# Patient Record
Sex: MALE | Race: WHITE | NOT HISPANIC OR LATINO | Employment: OTHER | URBAN - METROPOLITAN AREA
[De-identification: names, ages, dates, MRNs, and addresses within clinical notes are randomized per-mention and may not be internally consistent; named-entity substitution may affect disease eponyms.]

---

## 2024-10-31 ENCOUNTER — OFFICE VISIT (OUTPATIENT)
Dept: URGENT CARE | Age: 84
End: 2024-10-31
Payer: MEDICARE

## 2024-10-31 VITALS
HEART RATE: 67 BPM | RESPIRATION RATE: 18 BRPM | TEMPERATURE: 98 F | SYSTOLIC BLOOD PRESSURE: 148 MMHG | BODY MASS INDEX: 20.76 KG/M2 | DIASTOLIC BLOOD PRESSURE: 75 MMHG | OXYGEN SATURATION: 98 % | HEIGHT: 70 IN | WEIGHT: 145 LBS

## 2024-10-31 DIAGNOSIS — R09.82 POST-NASAL DRIP: Primary | ICD-10-CM

## 2024-10-31 RX ORDER — AMLODIPINE BESYLATE 5 MG/1
5 TABLET ORAL DAILY
COMMUNITY
Start: 2024-05-13

## 2024-10-31 RX ORDER — KETOCONAZOLE 20 MG/G
CREAM TOPICAL DAILY
COMMUNITY

## 2024-10-31 RX ORDER — VIT C/E/ZN/COPPR/LUTEIN/ZEAXAN 250MG-90MG
25 CAPSULE ORAL DAILY
COMMUNITY

## 2024-10-31 RX ORDER — AMMONIUM LACTATE 12 G/100G
LOTION TOPICAL 2 TIMES DAILY
COMMUNITY

## 2024-10-31 RX ORDER — TRIAMCINOLONE ACETONIDE 5 MG/G
CREAM TOPICAL 2 TIMES DAILY
COMMUNITY

## 2024-10-31 RX ORDER — NAPROXEN SODIUM 220 MG/1
81 TABLET, FILM COATED ORAL ONCE
COMMUNITY

## 2024-10-31 RX ORDER — ATORVASTATIN CALCIUM 40 MG/1
40 TABLET, FILM COATED ORAL DAILY
COMMUNITY
Start: 2024-05-13

## 2024-10-31 RX ORDER — SILDENAFIL 100 MG/1
100 TABLET, FILM COATED ORAL AS NEEDED
COMMUNITY

## 2024-10-31 RX ORDER — METOPROLOL SUCCINATE 50 MG/1
50 TABLET, EXTENDED RELEASE ORAL DAILY
COMMUNITY

## 2024-10-31 RX ORDER — SIMVASTATIN 20 MG/1
20 TABLET, FILM COATED ORAL NIGHTLY
COMMUNITY

## 2024-10-31 RX ORDER — FLUTICASONE PROPIONATE 50 MCG
1 SPRAY, SUSPENSION (ML) NASAL 2 TIMES DAILY
Qty: 16 G | Refills: 0 | Status: SHIPPED | OUTPATIENT
Start: 2024-10-31 | End: 2024-11-07

## 2024-10-31 RX ORDER — CLONAZEPAM 0.5 MG/1
0.5 TABLET ORAL DAILY
COMMUNITY

## 2024-10-31 RX ORDER — METOPROLOL SUCCINATE 25 MG/1
25 TABLET, EXTENDED RELEASE ORAL DAILY
COMMUNITY
Start: 2024-05-13

## 2025-02-03 NOTE — PROGRESS NOTES
Assessment and Plan:  To Parnell is a 84 y.o. 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 After discussing the risks and benefits of wide local excision with sentinel lymph node biopsy the patient understands and would like to proceed. We will look for OR time at [facility] in the coming weeks. I believe this will close primarily with a *** cm margin.     ***Tumor board discussion is pending    I spent 60 minutes in the professional and overall care of this patient.    Ruddy Kat MD  Assistant Professor of Surgery  Division of Surgical Oncology  [phone]  [email or web address]      History Of Present Illness  Referring provider: Jann Cárdenas  Diagnosis: melanoma  Location: left neck  Thickness: 2.1 mm  Margins: transected deep margin  Subtype: desmoplastic  High-risk features: dedifferentiation, ulceration, 1 mitosis    All other systems have been reviewed and are negative except as noted in the HPI.    I personally reviewed all necessary laboratory results, pathology reports, and radiologic images for this patient.    Past Medical History  He has no past medical history on file.    Surgical History  He has no past surgical history on file.     Social History  He reports that he has quit smoking. His smoking use included cigarettes. He has never used smokeless tobacco. No history on file for alcohol use and drug use.    Family History  No family history on file.     Allergies  Amoxicillin     Last Recorded Vitals  There were no vitals taken for this visit.    Physical Exam  General: no acute distress, well-nourished  Eyes: intact EOM, no scleral icterus  ENT: hearing intact, no drainage  Respiratory: symmetric chest rise, no cough  Cardiovascular: intact distal pulses, no pitting edema  Abdominal: Soft, nontender  Musculoskeletal: no deformities, intact strength  Integumentary: Healing biopsy site, no lymphadenopathy  Neuro:  no focal deficits, sensation intact  Psych: normal mood and affect     Relevant Results  ***internal review is pending

## 2025-02-05 ENCOUNTER — APPOINTMENT (OUTPATIENT)
Dept: SURGICAL ONCOLOGY | Facility: CLINIC | Age: 85
End: 2025-02-05
Payer: MEDICARE

## 2025-02-07 ENCOUNTER — LAB REQUISITION (OUTPATIENT)
Dept: DERMATOPATHOLOGY | Facility: CLINIC | Age: 85
End: 2025-02-07
Payer: MEDICARE

## 2025-02-07 ENCOUNTER — OFFICE VISIT (OUTPATIENT)
Facility: CLINIC | Age: 85
End: 2025-02-07
Payer: MEDICARE

## 2025-02-07 VITALS — WEIGHT: 145 LBS | BODY MASS INDEX: 20.76 KG/M2 | HEIGHT: 70 IN

## 2025-02-07 DIAGNOSIS — C43.4 MELANOMA OF NECK (MULTI): ICD-10-CM

## 2025-02-07 DIAGNOSIS — C43.4 MALIGNANT MELANOMA OF SCALP AND NECK (MULTI): ICD-10-CM

## 2025-02-07 PROCEDURE — 1160F RVW MEDS BY RX/DR IN RCRD: CPT | Performed by: OTOLARYNGOLOGY

## 2025-02-07 PROCEDURE — 1036F TOBACCO NON-USER: CPT | Performed by: OTOLARYNGOLOGY

## 2025-02-07 PROCEDURE — 99204 OFFICE O/P NEW MOD 45 MIN: CPT | Performed by: OTOLARYNGOLOGY

## 2025-02-07 PROCEDURE — 1126F AMNT PAIN NOTED NONE PRSNT: CPT | Performed by: OTOLARYNGOLOGY

## 2025-02-07 PROCEDURE — 1159F MED LIST DOCD IN RCRD: CPT | Performed by: OTOLARYNGOLOGY

## 2025-02-07 ASSESSMENT — PAIN SCALES - GENERAL: PAINLEVEL_OUTOF10: 0-NO PAIN

## 2025-02-07 NOTE — PROGRESS NOTES
ENT Outpatient Consultation    Chief Complaint: Melanoma  History Of Present Illness  To Parnell is a 84 y.o. male presents for evaluation of left neck melanoma. 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 Notes having some skin lesions frozen off in the past but denies personal history of skin cancer nor familial history of melanoma. No prior ENT surgeries. No symptomatic complaints otherwise since biopsy. Denies issues with neck pain/swelling or weight loss. Has history of heart surgery 10 years ago, on baby aspirin now. Retired . Here with wife.     Past Medical History  He has no past medical history on file.    Surgical History  He has no past surgical history on file.     Social History  He reports that he has quit smoking. His smoking use included cigarettes. He has never used smokeless tobacco. No history on file for alcohol use and drug use.    Family History  No family history on file.     Allergies  Amoxicillin     Physical Exam:  CONSTITUTIONAL:  No acute distress  VOICE:  No hoarseness or other abnormality  RESPIRATION:  Breathing comfortably, no stridor  CV:  No clubbing/cyanosis/edema in hands  EYES:  EOM intact, sclera normal  NEURO:  Alert and oriented times 3, Cranial nerves II-XII grossly intact and symmetric bilaterally  HEAD AND FACE:  Symmetric facial features, no masses or lesions, sinuses non-tender to palpation  SALIVARY GLANDS:  Parotid and submandibular glands normal bilaterally  EARS:  Normal external ears, external auditory canals, and TMs to otoscopy, normal hearing to whispered voice.  NOSE:  External nose midline, anterior rhinoscopy is normal with limited visualization to the anterior aspect of the interior turbinates, no bleeding or drainage, no lesions  ORAL CAVITY/OROPHARYNX/LIPS:  Normal mucous membranes, normal  floor of mouth/tongue/OP, no masses or lesions  PHARYNGEAL WALLS:  No masses or lesions  NECK/LYMPH:  No LAD, no thyroid masses, trachea midline  SKIN:  Left inferior anterior neck with sub-centimeter crusted-over scar  PSYCH:  Alert and oriented with appropriate mood and affect     Last Recorded Vitals  There were no vitals taken for this visit.    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 male with new desmoplastic melanoma of left inferolateral neck on recent punch biopsy without other concerning findings on presentation.    Discussed with patient need for sentinel lymph node biopsy to aid in guiding definitive management of said melanoma with wide local excision of lesion as well with primary closure. Informed consent including risks, benefits and alternatives of procedure as well as procedure itself discussed with patient who agreed to proceed with surgical management.    Andrew Drummond MD

## 2025-02-10 ENCOUNTER — TUMOR BOARD CONFERENCE (OUTPATIENT)
Dept: HEMATOLOGY/ONCOLOGY | Facility: HOSPITAL | Age: 85
End: 2025-02-10
Payer: MEDICARE

## 2025-02-10 DIAGNOSIS — C43.4 MELANOMA OF NECK (MULTI): Primary | ICD-10-CM

## 2025-02-10 LAB
PATH REPORT.FINAL DX SPEC: NORMAL
PATH REPORT.GROSS SPEC: NORMAL
PATH REPORT.RELEVANT HX SPEC: NORMAL
PATH REPORT.TOTAL CANCER: NORMAL
PATHOLOGY SYNOPTIC REPORT: NORMAL

## 2025-02-10 NOTE — TUMOR BOARD NOTE
General Patient Information  Name:  To Parnell  Evaluation #:  1  Conference Date:  2/10/2025  YOB: 1940  MRN:  [id number]  Program Physician(s):  Fermin Bautista  Referring Physician(s):  Jann Valenzuela      Summary   Stage: at least cIIB ([id number])   Melanoma 5 year survival: 87%    Assessment:  Left inferior lateral neck with an ulcerated melanoma, with features of desmoplastic and a dedifferentiated melanoma, Breslow at least 2.2 mm, broadly transected on the deep margin.    Recommendation: Consider imaging studies. WLE with 2 cm margins and SLNB.  Consider SWOG  study.    Review Multidisciplinary Cutaneous Oncology Conference recommendation with patient.  Continue routine follow up and total body skin exams with Jann Cárdenas.    Follow Up:  Jann Valenzuela      History and Physical Exam  Dermatologic History:   84 y.o. male with a biopsy of the left inferior lateral neck on 1/13/2025 showing an ulcerated melanoma,  with features of desmoplastic and a dedifferentiated melanoma  , Breslow: at least 2.2 mm, broadly transected on the deep margin.     Past Medical History:  No past medical history on file.    Family History of DNS/MM:  Unknown    Skin:   Left inferior anterior neck with sub-centimeter crusted-over scar     Lymphatic:  No LAD, no thyroid masses, trachea midline       Pathology  Dermpath Consult: [id number]     13 SLIDES, [facility], #[id number] (BX: 01/13/2025)     SKIN, LEFT INFERIOR LATERAL NECK, SHAVE BIOPSY:  ULCERATED MALIGNANT MELANOMA, BRESLOW THICKNESS AT LEAST 2.2 MM, PRESENT ON THE DEEP AND PERIPHERAL MARGIN, SEE NOTE.     Note: Microscopic examination reveals a specimen that extends into the mid to deep reticular dermis. There is an ulcer and there are atypical spindle cells with areas of dense solar elastosis. A moderate number of the spindle cells stain with antibodies against SOX-10 and S100  predominantly at the periphery. Centrally they do not stain with antibodies against SOX-10 or S100 and stain with antibodies against CD10. They do not stain with antibodies against MART/Melan-A, HMB45, CD31, CD34, CKAE1/AE3, Desmin or p63. They stain with antibodies against p53 and centrally they stain moderately with antibodies against Actin.      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      ** Electronically signed out by Gaurav Cantrell MD **    SPECIMEN   Procedure  Biopsy, shave   Specimen Laterality  Left   TUMOR   Tumor Site  Skin of scalp and neck: Left inferior lateral neck          Histologic Type  Features of desmoplastic melanoma and a dedifferentiated melanoma.     Maximum Tumor (Breslow) Thickness (Millimeters)  At least: 2.2 mm     Broadly transected on the deep margin   Ulceration  Not identified   Anatomic (Luis) Level  At least level: IV     Broadly transected on the deep margin   Mitotic Rate  1 mitoses per mm2   Microsatellite(s)  Not identified   Lymphovascular Invasion  Not identified   Neurotropism  Not identified   Tumor-Infiltrating Lymphocytes  Present, nonbrisk   Tumor Regression  Not identified   MARGINS     Margin Status for Invasive Melanoma  Invasive melanoma present at margin   Margin(s) Involved by Invasive Melanoma  Deep   Margin Status for Melanoma in situ  All margins negative for melanoma in situ   PATHOLOGIC STAGE CLASSIFICATION (pTNM, AJCC 8th Edition)     pT Category  pT3b     Radiology      Clinical Images  2/7/2025

## 2025-02-17 ENCOUNTER — TUMOR BOARD CONFERENCE (OUTPATIENT)
Dept: HEMATOLOGY/ONCOLOGY | Facility: HOSPITAL | Age: 85
End: 2025-02-17
Payer: MEDICARE

## 2025-02-18 ENCOUNTER — APPOINTMENT (OUTPATIENT)
Dept: SURGICAL ONCOLOGY | Facility: CLINIC | Age: 85
End: 2025-02-18
Payer: MEDICARE

## 2025-02-19 ENCOUNTER — LAB (OUTPATIENT)
Dept: LAB | Facility: HOSPITAL | Age: 85
End: 2025-02-19
Payer: MEDICARE

## 2025-02-19 ENCOUNTER — PRE-ADMISSION TESTING (OUTPATIENT)
Dept: PREADMISSION TESTING | Facility: HOSPITAL | Age: 85
End: 2025-02-19
Payer: MEDICARE

## 2025-02-19 VITALS
SYSTOLIC BLOOD PRESSURE: 125 MMHG | HEIGHT: 70 IN | HEART RATE: 71 BPM | DIASTOLIC BLOOD PRESSURE: 66 MMHG | RESPIRATION RATE: 16 BRPM | BODY MASS INDEX: 20.42 KG/M2 | TEMPERATURE: 97.5 F | OXYGEN SATURATION: 100 % | WEIGHT: 142.64 LBS

## 2025-02-19 DIAGNOSIS — Z01.818 ENCOUNTER FOR OTHER PREPROCEDURAL EXAMINATION: Primary | ICD-10-CM

## 2025-02-19 DIAGNOSIS — Z01.818 PRE-OP TESTING: Primary | ICD-10-CM

## 2025-02-19 DIAGNOSIS — C43.4 MELANOMA OF NECK (MULTI): ICD-10-CM

## 2025-02-19 LAB
ALBUMIN SERPL BCP-MCNC: 4.5 G/DL (ref 3.4–5)
ALP SERPL-CCNC: 52 U/L (ref 33–136)
ALT SERPL W P-5'-P-CCNC: 16 U/L (ref 10–52)
ANION GAP SERPL CALC-SCNC: 11 MMOL/L (ref 10–20)
AST SERPL W P-5'-P-CCNC: 22 U/L (ref 9–39)
ATRIAL RATE: 68 BPM
BILIRUB SERPL-MCNC: 0.7 MG/DL (ref 0–1.2)
BUN SERPL-MCNC: 17 MG/DL (ref 6–23)
CALCIUM SERPL-MCNC: 9.6 MG/DL (ref 8.6–10.3)
CHLORIDE SERPL-SCNC: 99 MMOL/L (ref 98–107)
CO2 SERPL-SCNC: 30 MMOL/L (ref 21–32)
CREAT SERPL-MCNC: 0.74 MG/DL (ref 0.5–1.3)
EGFRCR SERPLBLD CKD-EPI 2021: 89 ML/MIN/1.73M*2
GLUCOSE SERPL-MCNC: 95 MG/DL (ref 74–99)
P AXIS: 34 DEGREES
P OFFSET: 186 MS
P ONSET: 136 MS
POTASSIUM SERPL-SCNC: 4.2 MMOL/L (ref 3.5–5.3)
PR INTERVAL: 168 MS
PROT SERPL-MCNC: 7.7 G/DL (ref 6.4–8.2)
Q ONSET: 220 MS
QRS COUNT: 11 BEATS
QRS DURATION: 86 MS
QT INTERVAL: 384 MS
QTC CALCULATION(BAZETT): 408 MS
QTC FREDERICIA: 400 MS
R AXIS: -11 DEGREES
SODIUM SERPL-SCNC: 136 MMOL/L (ref 136–145)
T AXIS: 65 DEGREES
T OFFSET: 412 MS
VENTRICULAR RATE: 68 BPM

## 2025-02-19 PROCEDURE — 93005 ELECTROCARDIOGRAM TRACING: CPT

## 2025-02-19 PROCEDURE — 99202 OFFICE O/P NEW SF 15 MIN: CPT | Performed by: NURSE PRACTITIONER

## 2025-02-19 ASSESSMENT — DUKE ACTIVITY SCORE INDEX (DASI)
CAN YOU PARTICIPATE IN MODERATE RECREATIONAL ACTIVITIES LIKE GOLF, BOWLING, DANCING, DOUBLES TENNIS OR THROWING A BASEBALL OR FOOTBALL: NO
CAN YOU DO MODERATE WORK AROUND THE HOUSE LIKE VACUUMING, SWEEPING FLOORS OR CARRYING GROCERIES: YES
CAN YOU DO YARD WORK LIKE RAKING LEAVES, WEEDING OR PUSHING A MOWER: YES
CAN YOU DO HEAVY WORK AROUND THE HOUSE LIKE SCRUBBING FLOORS OR LIFTING AND MOVING HEAVY FURNITURE: YES
CAN YOU CLIMB A FLIGHT OF STAIRS OR WALK UP A HILL: YES
CAN YOU PARTICIPATE IN STRENOUS SPORTS LIKE SWIMMING, SINGLES TENNIS, FOOTBALL, BASKETBALL, OR SKIING: NO
CAN YOU WALK A BLOCK OR TWO ON LEVEL GROUND: YES
CAN YOU DO LIGHT WORK AROUND THE HOUSE LIKE DUSTING OR WASHING DISHES: YES
CAN YOU HAVE SEXUAL RELATIONS: YES
TOTAL_SCORE: 36.7
DASI METS SCORE: 7.3
CAN YOU TAKE CARE OF YOURSELF (EAT, DRESS, BATHE, OR USE TOILET): YES
CAN YOU WALK INDOORS, SUCH AS AROUND YOUR HOUSE: YES
CAN YOU RUN A SHORT DISTANCE: NO

## 2025-02-19 ASSESSMENT — ENCOUNTER SYMPTOMS
EYES NEGATIVE: 1
NECK NEGATIVE: 1
RESPIRATORY NEGATIVE: 1
MUSCULOSKELETAL NEGATIVE: 1
CONSTITUTIONAL NEGATIVE: 1
GASTROINTESTINAL NEGATIVE: 1
ROS SKIN COMMENTS: SEE HPI
NEUROLOGICAL NEGATIVE: 1
ENDOCRINE NEGATIVE: 1

## 2025-02-19 ASSESSMENT — PAIN - FUNCTIONAL ASSESSMENT: PAIN_FUNCTIONAL_ASSESSMENT: 0-10

## 2025-02-19 ASSESSMENT — ACTIVITIES OF DAILY LIVING (ADL): ADL_SCORE: 0

## 2025-02-19 ASSESSMENT — LIFESTYLE VARIABLES: SMOKING_STATUS: NONSMOKER

## 2025-02-19 ASSESSMENT — PAIN SCALES - GENERAL: PAINLEVEL_OUTOF10: 0 - NO PAIN

## 2025-02-19 NOTE — H&P (VIEW-ONLY)
CPM/PAT Evaluation       Name: To Parnell (To Parnell)  /Age: 1940/84 y.o.     In-Person       Chief Complaint: melanoma    HPI  This is a very pleasant 83 yo with Pmhx of CAD, HLD, HTN, and melanoma left side of neck.  Pt reports that he noticed bump that looked like a pimple on the side of his neck.   He said it wouldn't go away and he had it checked out and biopsied.  It was + for melanoma.  He denies recent fever or chills.      Past Medical History:   Diagnosis Date    Anxiety     Coronary artery disease     ED (erectile dysfunction)     HISTORY    Hearing aid worn     HL (hearing loss)     [place] (hard of hearing)     Hyperlipidemia     Hypertension     Melanoma (Multi)        Past Surgical History:   Procedure Laterality Date    CORONARY ARTERY BYPASS GRAFT      HISTORY       Patient  reports being sexually active and has had partner(s) who are female.    Family History   Problem Relation Name Age of Onset    Dementia Sister         Allergies   Allergen Reactions    Amoxicillin Rash     Dentist gave recently     Dentist gave recently    Penicillins GI Upset    Ramipril Cough       Prior to Admission medications    Medication Sig Start Date End Date Taking? Authorizing Provider   amLODIPine (Norvasc) 5 mg tablet Take 1 tablet (5 mg) by mouth once daily. 24   Historical Provider, MD   ammonium lactate (Lac-Hydrin) 12 % lotion Apply topically twice a day.    Historical Provider, MD   aspirin 81 mg chewable tablet Chew 1 tablet (81 mg) 1 time.    Historical Provider, MD   atorvastatin (Lipitor) 40 mg tablet Take 1 tablet (40 mg) by mouth once daily. 24   Historical Provider, MD   cholecalciferol (Vitamin D-3) 25 MCG (1000 UT) capsule Take 1 capsule (25 mcg) by mouth once daily.    Historical Provider, MD   clonazePAM (KlonoPIN) 0.5 mg tablet Take 1 tablet (0.5 mg) by mouth once daily.    Historical Provider, MD   fluticasone (Flonase Allergy Relief) 50 mcg/actuation nasal spray  Administer 1 spray into each nostril 2 times a day for 7 days. Shake gently. Before first use, prime pump. After use, clean tip and replace cap. 10/31/24 11/7/24  Lina Cho PA-C   ketoconazole (NIZOral) 2 % cream Apply topically once daily.    Historical Provider, MD   metoprolol succinate XL (Toprol-XL) 25 mg 24 hr tablet Take 1 tablet (25 mg) by mouth once daily. 5/13/24   Historical Provider, MD   metoprolol succinate XL (Toprol-XL) 50 mg 24 hr tablet Take 1 tablet (50 mg) by mouth once daily.    Historical Provider, MD   sildenafil (Viagra) 100 mg tablet Take 1 tablet (100 mg) by mouth if needed.    Historical Provider, MD   simvastatin (Zocor) 20 mg tablet Take 1 tablet (20 mg) by mouth once daily at bedtime.    Historical Provider, MD   triamcinolone (Kenalog) 0.5 % cream Apply topically 2 times a day.    Historical Provider, MD        PAT ROS:   Constitutional:   neg    Neuro/Psych:    Denies  hx of seizure or stroke  neg    Eyes:   neg     use of corrective lenses  Ears:    hearing loss   hearing aides  Nose:   neg    Mouth:   neg    Throat:   neg    Neck:    No carotid bruits ausculutated  neg    Cardio:    Hx of CABG- follows with Dr. Cruz    Denies recent CP or palpitations  Respiratory:    Denies recent SOB    neg    Endocrine:   neg    GI:   neg    :    Hx of elevated PSA  No dysuria of difficulty emptying bladder    neg    Musculoskeletal:   neg    Hematologic:   neg    Skin:   See HPI      Physical Exam  Constitutional:       Appearance: Normal appearance.   HENT:      Head: Normocephalic and atraumatic.      Nose: Nose normal.      Mouth/Throat:      Mouth: Mucous membranes are moist.   Eyes:      Pupils: Pupils are equal, round, and reactive to light.   Neck:      Comments: No carotid bruits ausculutated  Cardiovascular:      Rate and Rhythm: Normal rate and regular rhythm.   Pulmonary:      Effort: Pulmonary effort is normal.      Breath sounds: Normal breath sounds.   Abdominal:       General: Bowel sounds are normal.      Palpations: Abdomen is soft.   Musculoskeletal:      Comments: No LE ankle edema   Skin:     General: Skin is warm and dry.   Neurological:      General: No focal deficit present.      Mental Status: He is alert and oriented to person, place, and time.   Psychiatric:         Mood and Affect: Mood normal.         Behavior: Behavior normal.      Airway: nl neck ROM  Anesthesia:  Patient denies any anesthesia complications.   Teeth: upper denture             Denies loose teeth    Testing/Diagnostic:   ECG: SR with PVC's    Recent Results (from the past week)   ECG 12 lead    Collection Time: 02/19/25  9:45 AM   Result Value Ref Range    Ventricular Rate 68 BPM    Atrial Rate 68 BPM    AL Interval 168 ms    QRS Duration 86 ms    QT Interval 384 ms    QTC Calculation(Bazett) 408 ms    P Axis 34 degrees    R Axis -11 degrees    T Axis 65 degrees    QRS Count 11 beats    Q Onset 220 ms    P Onset 136 ms    P Offset 186 ms    T Offset 412 ms    QTC Fredericia 400 ms   Comprehensive metabolic panel    Collection Time: 02/19/25 11:04 AM   Result Value Ref Range    Glucose 95 74 - 99 mg/dL    Sodium 136 136 - 145 mmol/L    Potassium 4.2 3.5 - 5.3 mmol/L    Chloride 99 98 - 107 mmol/L    Bicarbonate 30 21 - 32 mmol/L    Anion Gap 11 10 - 20 mmol/L    Urea Nitrogen 17 6 - 23 mg/dL    Creatinine 0.74 0.50 - 1.30 mg/dL    eGFR 89 >60 mL/min/1.73m*2    Calcium 9.6 8.6 - 10.3 mg/dL    Albumin 4.5 3.4 - 5.0 g/dL    Alkaline Phosphatase 52 33 - 136 U/L    Total Protein 7.7 6.4 - 8.2 g/dL    AST 22 9 - 39 U/L    Bilirubin, Total 0.7 0.0 - 1.2 mg/dL    ALT 16 10 - 52 U/L         Dermpath Consult: [id number]  Order: [id number]   Collected 2/7/2025 12:38     Status: Final result     Visible to patient: No [facility]     Dx: Malignant melanoma of scalp and neck ...    0 Result Notes      Component    FINAL DIAGNOSIS   13 SLIDES, [facility],  #[id number] (BX: 01/13/2025)     SKIN, LEFT INFERIOR LATERAL NECK, SHAVE BIOPSY:  ULCERATED MALIGNANT MELANOMA, BRESLOW THICKNESS AT LEAST 2.2 MM, PRESENT ON THE DEEP AND PERIPHERAL MARGIN, SEE NOTE.     Note: Microscopic examination reveals a specimen that extends into the mid to deep reticular dermis. There is an ulcer and there are atypical spindle cells with areas of dense solar elastosis. A moderate number of the spindle cells stain with antibodies against SOX-10 and S100 predominantly at the periphery. Centrally they do not stain with antibodies against SOX-10 or S100 and stain with antibodies against CD10. They do not stain with antibodies against MART/Melan-A, HMB45, CD31, CD34, CKAE1/AE3, Desmin or p63. They stain with antibodies against p53 and centrally they stain moderately with antibodies against Actin.      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      ** Electronically signed out by Gaurav Cantrell MD **            ECHO 6/2023  Impression  Performed by [facility]  CONCLUSIONS:  - Exam indication: Coronary artery disease involving native heart without angina  pectoris  - The left ventricle is normal in size. Left ventricular systolic function is  normal. EF = 57 ± 5% (2D biplane) Grade I left ventricular diastolic dysfunction.  - The right ventricle is normal in size. Right ventricular systolic function is  normal.  - Estimated right ventricular systolic pressure is likely underestimated due to a  weak or incomplete tricuspid regurgitation signal and is, at least, 20 mmHg  consistent with normal pulmonary artery pressures. Estimated right atrial pressure   is 3 mmHg based on IVC assessment.  - Exam was compared with the prior  echocardiographic exam performed on  4/22/2020. There is no significant change.  Electronically signed by Camden Krishnamurthy MD on 6/20/2023 at 10:16:47 AM       Patient  "Specialist/PCP:     Visit Vitals  /66   Pulse 71   Temp 36.4 °C (97.5 °F) (Temporal)   Resp 16   Ht 1.778 m (5' 10\")   Wt 64.7 kg (142 lb 10.2 oz)   SpO2 100%   BMI 20.47 kg/m²   Smoking Status Former   BSA 1.79 m²       DASI Risk Score      Flowsheet Row Pre-Admission Testing from 2/19/2025 in [facility]   Can you take care of yourself (eat, dress, bathe, or use toilet)?  2.75 filed at 02/19/2025 1021   Can you walk indoors, such as around your house? 1.75 filed at 02/19/2025 1021   Can you walk a block or two on level ground?  2.75 filed at 02/19/2025 1021   Can you climb a flight of stairs or walk up a hill? 5.5 filed at 02/19/2025 1021   Can you run a short distance? 0 filed at 02/19/2025 1021   Can you do light work around the house like dusting or washing dishes? 2.7 filed at 02/19/2025 1021   Can you do moderate work around the house like vacuuming, sweeping floors or carrying groceries? 3.5 filed at 02/19/2025 1021   Can you do heavy work around the house like scrubbing floors or lifting and moving heavy furniture?  8 filed at 02/19/2025 1021   Can you do yard work like raking leaves, weeding or pushing a mower? 4.5 filed at 02/19/2025 1021   Can you have sexual relations? 5.25 filed at 02/19/2025 1021   Can you participate in moderate recreational activities like golf, bowling, dancing, doubles tennis or throwing a baseball or football? 0 filed at 02/19/2025 1021   Can you participate in strenous sports like swimming, singles tennis, football, basketball, or skiing? 0 filed at 02/19/2025 1021   DASI SCORE 36.7 filed at 02/19/2025 1021   METS Score (Will be calculated only when all the questions are answered) 7.3 filed at 02/19/2025 1021          Caprini DVT Assessment      Flowsheet Row Pre-Admission Testing from 2/19/2025 in [facility]   DVT Score (IF A SCORE IS NOT CALCULATING, MUST SELECT A BMI TO COMPLETE) 8 filed at 02/19/2025 1020   Medical Factors Present cancer, " chemotherapy, or previous malignancy filed at 02/19/2025 1020   Surgical Factors Major surgery planned, including arthroscopic and laproscopic (1-2 hours) filed at 02/19/2025 1020   BMI (BMI MUST BE CHOSEN) 30 or less filed at 02/19/2025 1020          Modified Frailty Index      [place] [facility]   Non-independent functional status (problems with dressing, bathing, personal grooming, or cooking) 0 filed at 02/19/2025 1022   History of diabetes mellitus  0 filed at 02/19/2025 1022   History of COPD 0 filed at 02/19/2025 1022   History of CHF No filed at 02/19/2025 1022   History of MI 0 filed at 02/19/2025 1022   History of Percutaneous Coronary Intervention, Cardiac Surgery, or Angina 0.0909 filed at 02/19/2025 1022   Hypertension requiring the use of medication  0.0909 filed at 02/19/2025 1022   Peripheral vascular disease 0 filed at 02/19/2025 1022   Impaired sensorium (cognitive impairement or loss, clouding, or delirium) 0 filed at 02/19/2025 1022   TIA or CVA withouy residual deficit 0 filed at 02/19/2025 1022   Cerebrovascular accident with deficit 0 filed at 02/19/2025 1022   Modified Frailty Index Calculator .1818 filed at 02/19/2025 1022          CHADS2 Stroke Risk  Current as of 17 minutes ago        N/A 3 to 100%: High Risk   2 to < 3%: Medium Risk   0 to < 2%: Low Risk     Last Change: N/A          This score determines the patient's risk of having a stroke if the patient has atrial fibrillation.        This score is not applicable to this patient. Components are not calculated.          Revised Cardiac Risk Index      Flowsheet Row [place] [facility]   High-Risk Surgery (Intraperitoneal, Intrathoracic,Suprainguinal vascular) 0 filed at 02/19/2025 1021   History of ischemic heart disease (History of MI, History of positive exercuse test, Current chest paint considered due to myocardial ischemia,  Use of nitrate therapy, ECG with pathological Q Waves) 1 filed at 2025 1021   History of congestive heart failure (pulmonary edemia, bilateral rales or S3 gallop, Paroxysmal nocturnal dyspnea, CXR showing pulmonary vascular redistribution) 0 filed at 2025 1021   History of cerebrovascular disease (Prior TIA or stroke) 0 filed at 2025 1021   Pre-operative insulin treatment 0 filed at 2025 1021   Pre-operative creatinine>2 mg/dl 0 filed at 2025 1021   Revised Cardiac Risk Calculator 1 filed at 2025 1021          Apfel Simplified Score      [place]sheet [place] [facility] [place]   Smoking status 1 filed at 2025 1021   History of motion sickness or PONV  0 filed at 2025 1021   Use of postoperative opioids 0 filed at 2025 1021   Gender - Female 0=No filed at 2025 1021   Apfel Simplified Score Calculator 1 filed at 2025 1021          Risk Analysis Index Results This Encounter         2025  1022             Do you live in a place other than your own home?: 0    When did you begin living in the place you are currently residing?: Greater than one year ago    Any kidney failure, kidney not working well, or seeing a kidney doctor (nephrologist)? If yes, was this for kidney stones or another problem?: 0 No    Any history of chronic (long-term) congestive heart failure (CHF)?: 0 No    Any shortness of breath when resting?: 0 No    In the past five years, have you been diagnosed with or treated for cancer?: Yes    During the last 3 months has it become difficult for you to remember things or organize your thoughts?: 0 No    Have you lost weight of 10 pounds or more in the past 3 months without trying?: 0 No    Do you have any loss of appetitie?: 0 No    Getting Around (Mobility): 0 Can get around without help    Eatin Can plan and prepare own meals    Toiletin Can use toilet without any help    Personal Hygiene  (Bathing, Hand Washing, Changing Clothes): 0 Can shower or bathe without any help    SHEN Cancer History: Patient indicates history of cancer    Total Risk Analysis Index Score Without Cancer: 29    Total Risk Analysis Index Score: 38          Stop Bang Score      Flowsheet Row Pre-Admission Testing from 2/19/2025 in [facility]   Do you snore loudly? 0 filed at 02/19/2025 1020   Do you often feel tired or fatigued after your sleep? 0 filed at 02/19/2025 1020   Has anyone ever observed you stop breathing in your sleep? 0 filed at 02/19/2025 1020   Do you have or are you being treated for high blood pressure? 1 filed at 02/19/2025 1020   Recent BMI (Calculated) 20.5 filed at 02/19/2025 1020   Is BMI greater than 35 kg/m2? 0=No filed at 02/19/2025 1020   Age older than 50 years old? 1=Yes filed at 02/19/2025 1020   Gender - Male 1=Yes filed at 02/19/2025 1020          Prodigy: High Risk  Total Score: 24              Prodigy Age Score      Prodigy Gender Score          ARISCAT Score for Postoperative Pulmonary Complications      Flowsheet Row Pre-Admission Testing from 2/19/2025 in [facility]   Age Calculated Score 16 filed at 02/19/2025 1022   Preoperative SpO2 0 filed at 02/19/2025 1022   Respiratory infection in the last month Either upper or lower (i.e., URI, bronchitis, pneumonia), with fever and antibiotic treatment 0 filed at 02/19/2025 1022   Preoperative anemia (Hgb less than 10 g/dl) 0 filed at 02/19/2025 1022   Surgical incision  0 filed at 02/19/2025 1022   Duration of surgery  0 filed at 02/19/2025 1022   Emergency Procedure  0 filed at 02/19/2025 1022   ARISCAT Total Score  16 filed at 02/19/2025 1022          Suh Perioperative Risk for Myocardial Infarction or Cardiac Arrest (FLORIAN)      Flowsheet Row Pre-Admission Testing from 2/19/2025 in [facility]   Calculated Age Score 1.68 filed at 02/19/2025 1023   Functional Status  0 filed at 02/19/2025 1023   ASA  Class  -3.29 filed at 02/19/2025 1023   Creatinine 0 filed at 02/19/2025 1023   Type of Procedure  0.54 filed at 02/19/2025 1023   FLORIAN Total Score  -6.32 filed at 02/19/2025 1023   FLORIAN % 0.18 filed at 02/19/2025 1023            Assessment and Plan:     Plan for OR on 2/27 for   EXCISION, LESION, FACE   Wide local excision of left neck melanoma, sentinel lymph node biopsy, possible skin graft, NUCLEAR MEDICINE INJ AT:  0700 AM [07274 (CPT®)] Left General   BIOPSY, LYMPH NODE, HEAD AND NECK REGION [00737 (CPT®)] Bilateral    SURGICAL PROCUREMENT, GRAFT, SKIN, FULL-THICKNESS, HEAD AND NECK REGION [47034 (CPT®)] Bilateral    Due to melanoma of neck  CMP    HEENT/Airway:  no significant findings on chart review or clinical presentation    Cardiovascular:    Request cardiac clearance  Hx of CABG follows with Dr Cruz [facility]  DASI  Harper Activity Status Index (DASI)  DASI Score: 36.7   MET Score: 7.3  RCRI: 1 point, 6.0% risk for postoperative MACE   FLORIAN: 0.18% risk for postoperative MACE      Pulmonary:  no significant findings on chart review or clinical presentation    Preoperative deep breathing educational handout provided to patient.  ARISCAT: 16     points which is a low (1.6%) risk of in-hospital post-op pulmonary complications   STOP BANG:   3  points which is a intermediate risk for moderate to severe KAILASH    Renal: no significant findings on chart review or clinical presentation    Endocrine:  no significant findings on chart review or clinical presentation    Hematologic:   no significant findings on chart review or clinical presentation  Preoperative DVT educational handout provided to patient.  Caprini Score:  8  points which is a high risk of perioperative VTE    Gastrointestinal:   no significant findings on chart review or clinical presentation  Apfel: 1 points 21% risk for post operative N/V    Infectious disease:  no significant findings on chart review or clinical presentation     Musculoskeletal:  no  acute active issues    Neuro:    No neurologic diagnoses, however, the patient is at an increased risk for post operative delirium secondary to age >/= 65.  Preoperative brain exercise educational handout provided to patient.    The patient is at an increased risk for perioperative stroke secondary to cardiac disease, increased age, HTN, HLD, and general anesthesia.

## 2025-02-19 NOTE — PREPROCEDURE INSTRUCTIONS
Medication List            Accurate as of February 19, 2025 10:34 AM. Always use your most recent med list.                amLODIPine 5 mg tablet  Commonly known as: Norvasc  Medication Adjustments for Surgery: Take/Use as prescribed     ammonium lactate 12 % lotion  Commonly known as: Lac-Hydrin  Medication Adjustments for Surgery: Do Not take on the morning of surgery     aspirin 81 mg chewable tablet  Notes to patient: Discuss with cardiology stop date before surgery     atorvastatin 40 mg tablet  Commonly known as: Lipitor  Medication Adjustments for Surgery: Take/Use as prescribed     cholecalciferol 25 MCG (1000 UT) capsule  Commonly known as: Vitamin D-3  Additional Medication Adjustments for Surgery: Take last dose 7 days before surgery     clonazePAM 0.5 mg tablet  Commonly known as: KlonoPIN  Medication Adjustments for Surgery: Take/Use as prescribed     fluticasone 50 mcg/actuation nasal spray  Commonly known as: Flonase Allergy Relief  Administer 1 spray into each nostril 2 times a day for 7 days. Shake gently. Before first use, prime pump. After use, clean tip and replace cap.  Medication Adjustments for Surgery: Take/Use as prescribed     ketoconazole 2 % cream  Commonly known as: NIZOral  Medication Adjustments for Surgery: Do Not take on the morning of surgery     metoprolol succinate XL 25 mg 24 hr tablet  Commonly known as: Toprol-XL  Medication Adjustments for Surgery: Take/Use as prescribed     sildenafil 100 mg tablet  Commonly known as: Viagra  Medication Adjustments for Surgery: Take last dose 3 days before surgery     triamcinolone 0.5 % cream  Commonly known as: Kenalog  Medication Adjustments for Surgery: Do Not take on the morning of surgery                     PRE-OPERATIVE INSTRUCTIONS    You will receive notification one business day prior to your procedure to confirm your arrival time. It is important that you answer your phone and/or check your messages during this time. If you do  not hear from the surgery center by 5 pm. the day before your procedure, please call [phone].     Please enter the building through the Outpatient entrance and take the elevator off the lobby to the 2nd floor then check in at the Outpatient Surgery desk on the 2nd floor.    INSTRUCTIONS:  Talk to your surgeon for instructions if you should stop your aspirin, blood thinner, or diabetes medicines.  DO NOT take any multivitamins or over the counter supplements for 7-10 days before surgery.  If not being admitted, you must have an adult immediately available to drive you home after surgery. We also highly recommend you have someone stay with you for the entire day and night of your surgery.  For children having surgery, a parent or legal guardian must accompany them to the surgery center. If this is not possible, please call [phone] to make additional arrangements.  For adults who are unable to consent or make medical decisions for themselves, a legal guardian or Power of  must accompany them to the surgery center. If this is not possible, please call [phone] to make additional arrangements.  Wear comfortable, loose fitting clothing.  All jewelry and piercings must be removed. If you are unable to remove an item or have a dermal piercing, please be sure to tell the nurse when you arrive for surgery.  Nail polish and make-up must be removed.  Avoid smoking or consuming alcohol for 24 hours before surgery.  To help prevent infection, please take a shower/bath and wash your hair the night before and/or morning of surgery (or follow other specific bathing instructions provided).    Preoperative Fasting Guidelines    Why must I stop eating and drinking near surgery time?  With sedation, food or liquid in your stomach can enter your lungs causing serious complications  Increases nausea and vomiting    When do I need to stop eating and drinking before my surgery?  Do not eat any solid food after midnight  the night before your surgery/procedure unless otherwise instructed by your surgeon.   You may have up to 13.5 ounces of clear liquid until TWO hours before your instructed arrival time to the hospital.  This includes water, black tea/coffee, (no milk or cream) apple juice, and electrolyte drinks (Gatorade).   You may chew gum until TWO hours before your surgery/procedure      If applicable, notify your surgeons office immediately of any new skin changes that occur to the surgical limb.      If you have any questions or concerns, please call Pre-Admission Testing at [phone].

## 2025-02-19 NOTE — PREPROCEDURE INSTRUCTIONS
Thank you for visiting Preadmission Testing at [facility]. If you have any changes to your health condition, please call the SURGEON's office to alert them and give them details of your symptoms.        Preoperative Brain Exercises    What are brain exercises?  A brain exercise is any activity that engages your thinking (cognitive) skills.    What types of activities are considered brain exercises?  Jigsaw puzzles, crossword puzzles, word jumble, memory games, word search, and many more.  Many can be found free online or on your phone via a mobile teri.    Why should I do brain exercises before my surgery?  More recent research has shown brain exercise before surgery can lower the risk of postoperative delirium (confusion) which can be especially important for older adults.  Patients who did brain exercises for 5 to 10 hours the days before surgery, cut their risk of postoperative delirium in half up to 1 week after surgery.      Preoperative Deep Breathing Exercises    Why it is important to do deep breathing exercises before my surgery?  Deep breathing exercises strengthen your breathing muscles.  This helps you to recover after your surgery and decreases the chance of breathing complications.    How are the deep breathing exercises done?  Sit straight with your back supported.  Breathe in deeply and slowly through your nose. Your lower rib cage should expand and your abdomen may move forward.  Hold that breath for 3 to 5 seconds.  Breathe out through pursed lips, slowly and completely.  Rest and repeat 10 times every hour while awake.  Rest longer if you become dizzy or lightheaded.      Patient and Family Education   Ways You Can Help Prevent Blood Clots     This handout explains some simple things you can do to help prevent blood clots.      Blood clots are blockages that can form in the body's veins. When a blood clot forms in your deep veins, it may be called a deep vein thrombosis, or DVT for short. Blood clots  can happen in any part of the body where blood flows, but they are most common in the arms and legs. If a piece of a blood clot breaks free and travels to the lungs, it is called a pulmonary embolus (PE). A PE can be a very serious problem.      Being in the hospital or having surgery can raise your chances of getting a blood clot because you may not be well enough to move around as much as you normally do.      Ways you can help prevent blood clots in the hospital         Wearing SCDs. SCDs stands for Sequential Compression Devices.   SCDs are special sleeves that wrap around your legs  They attach to a pump that fills them with air to gently squeeze your legs every few minutes.   This helps return the blood in your legs to your heart.   SCDs should only be taken off when walking or bathing.   SCDs may not be comfortable, but they can help save your life.               Wearing compression stockings - if your doctor orders them. These special snug fitting stockings gently squeeze your legs to help blood flow.       Walking. Walking helps move the blood in your legs.   If your doctor says it is ok, try walking the halls at least   5 times a day. Ask us to help you get up, so you don't fall.      Taking any blood thinning medicines your doctor orders.          ©[facility]; 3/23        Ways you can help prevent blood clots at home       Wearing compression stockings - if your doctor orders them. ? Walking - to help move the blood in your legs.       Taking any blood thinning medicines your doctor orders.      Signs of a blood clot or PE      Tell your doctor or nurse know right away if you have of the problems listed below.    If you are at home, seek medical care right away. Call 911 for chest pain or problems breathing.          Signs of a blood clot (DVT) - such as pain,  swelling, redness or warmth in your arm or leg      Signs of a pulmonary embolism (PE) - such as chest     pain or feeling short of  breath

## 2025-02-19 NOTE — CPM/PAT H&P
CPM/PAT Evaluation       Name: To Parnell (To Parnell)  /Age: 1940/84 y.o.     In-Person       Chief Complaint: melanoma    HPI  This is a very pleasant 85 yo with Pmhx of CAD, HLD, HTN, and melanoma left side of neck.  Pt reports that he noticed bump that looked like a pimple on the side of his neck.   He said it wouldn't go away and he had it checked out and biopsied.  It was + for melanoma.  He denies recent fever or chills.      Past Medical History:   Diagnosis Date    Anxiety     Coronary artery disease     ED (erectile dysfunction)     HISTORY    Hearing aid worn     HL (hearing loss)     [place] (hard of hearing)     Hyperlipidemia     Hypertension     Melanoma (Multi)        Past Surgical History:   Procedure Laterality Date    CORONARY ARTERY BYPASS GRAFT      HISTORY       Patient  reports being sexually active and has had partner(s) who are female.    Family History   Problem Relation Name Age of Onset    Dementia Sister         Allergies   Allergen Reactions    Amoxicillin Rash     Dentist gave recently     Dentist gave recently    Penicillins GI Upset    Ramipril Cough       Prior to Admission medications    Medication Sig Start Date End Date Taking? Authorizing Provider   amLODIPine (Norvasc) 5 mg tablet Take 1 tablet (5 mg) by mouth once daily. 24   Historical Provider, MD   ammonium lactate (Lac-Hydrin) 12 % lotion Apply topically twice a day.    Historical Provider, MD   aspirin 81 mg chewable tablet Chew 1 tablet (81 mg) 1 time.    Historical Provider, MD   atorvastatin (Lipitor) 40 mg tablet Take 1 tablet (40 mg) by mouth once daily. 24   Historical Provider, MD   cholecalciferol (Vitamin D-3) 25 MCG (1000 UT) capsule Take 1 capsule (25 mcg) by mouth once daily.    Historical Provider, MD   clonazePAM (KlonoPIN) 0.5 mg tablet Take 1 tablet (0.5 mg) by mouth once daily.    Historical Provider, MD   fluticasone (Flonase Allergy Relief) 50 mcg/actuation nasal spray  Administer 1 spray into each nostril 2 times a day for 7 days. Shake gently. Before first use, prime pump. After use, clean tip and replace cap. 10/31/24 11/7/24  Lina Cho PA-C   ketoconazole (NIZOral) 2 % cream Apply topically once daily.    Historical Provider, MD   metoprolol succinate XL (Toprol-XL) 25 mg 24 hr tablet Take 1 tablet (25 mg) by mouth once daily. 5/13/24   Historical Provider, MD   metoprolol succinate XL (Toprol-XL) 50 mg 24 hr tablet Take 1 tablet (50 mg) by mouth once daily.    Historical Provider, MD   sildenafil (Viagra) 100 mg tablet Take 1 tablet (100 mg) by mouth if needed.    Historical Provider, MD   simvastatin (Zocor) 20 mg tablet Take 1 tablet (20 mg) by mouth once daily at bedtime.    Historical Provider, MD   triamcinolone (Kenalog) 0.5 % cream Apply topically 2 times a day.    Historical Provider, MD        PAT ROS:   Constitutional:   neg    Neuro/Psych:    Denies  hx of seizure or stroke  neg    Eyes:   neg     use of corrective lenses  Ears:    hearing loss   hearing aides  Nose:   neg    Mouth:   neg    Throat:   neg    Neck:    No carotid bruits ausculutated  neg    Cardio:    Hx of CABG- follows with Dr. Cruz    Denies recent CP or palpitations  Respiratory:    Denies recent SOB    neg    Endocrine:   neg    GI:   neg    :    Hx of elevated PSA  No dysuria of difficulty emptying bladder    neg    Musculoskeletal:   neg    Hematologic:   neg    Skin:   See HPI      Physical Exam  Constitutional:       Appearance: Normal appearance.   HENT:      Head: Normocephalic and atraumatic.      Nose: Nose normal.      Mouth/Throat:      Mouth: Mucous membranes are moist.   Eyes:      Pupils: Pupils are equal, round, and reactive to light.   Neck:      Comments: No carotid bruits ausculutated  Cardiovascular:      Rate and Rhythm: Normal rate and regular rhythm.   Pulmonary:      Effort: Pulmonary effort is normal.      Breath sounds: Normal breath sounds.   Abdominal:       General: Bowel sounds are normal.      Palpations: Abdomen is soft.   Musculoskeletal:      Comments: No LE ankle edema   Skin:     General: Skin is warm and dry.   Neurological:      General: No focal deficit present.      Mental Status: He is alert and oriented to person, place, and time.   Psychiatric:         Mood and Affect: Mood normal.         Behavior: Behavior normal.      Airway: nl neck ROM  Anesthesia:  Patient denies any anesthesia complications.   Teeth: upper denture             Denies loose teeth    Testing/Diagnostic:   ECG: SR with PVC's    Recent Results (from the past week)   ECG 12 lead    Collection Time: 02/19/25  9:45 AM   Result Value Ref Range    Ventricular Rate 68 BPM    Atrial Rate 68 BPM    AL Interval 168 ms    QRS Duration 86 ms    QT Interval 384 ms    QTC Calculation(Bazett) 408 ms    P Axis 34 degrees    R Axis -11 degrees    T Axis 65 degrees    QRS Count 11 beats    Q Onset 220 ms    P Onset 136 ms    P Offset 186 ms    T Offset 412 ms    QTC Fredericia 400 ms   Comprehensive metabolic panel    Collection Time: 02/19/25 11:04 AM   Result Value Ref Range    Glucose 95 74 - 99 mg/dL    Sodium 136 136 - 145 mmol/L    Potassium 4.2 3.5 - 5.3 mmol/L    Chloride 99 98 - 107 mmol/L    Bicarbonate 30 21 - 32 mmol/L    Anion Gap 11 10 - 20 mmol/L    Urea Nitrogen 17 6 - 23 mg/dL    Creatinine 0.74 0.50 - 1.30 mg/dL    eGFR 89 >60 mL/min/1.73m*2    Calcium 9.6 8.6 - 10.3 mg/dL    Albumin 4.5 3.4 - 5.0 g/dL    Alkaline Phosphatase 52 33 - 136 U/L    Total Protein 7.7 6.4 - 8.2 g/dL    AST 22 9 - 39 U/L    Bilirubin, Total 0.7 0.0 - 1.2 mg/dL    ALT 16 10 - 52 U/L         Dermpath Consult: [id number]  Order: [id number]   Collected 2/7/2025 12:38     Status: Final result     Visible to patient: No ([facility]     Dx: Malignant melanoma of scalp and neck ...    0 Result Notes      Component    FINAL DIAGNOSIS   13 SLIDES, [facility],  #[id number] (BX: 01/13/2025)     SKIN, LEFT INFERIOR LATERAL NECK, SHAVE BIOPSY:  ULCERATED MALIGNANT MELANOMA, BRESLOW THICKNESS AT LEAST 2.2 MM, PRESENT ON THE DEEP AND PERIPHERAL MARGIN, SEE NOTE.     Note: Microscopic examination reveals a specimen that extends into the mid to deep reticular dermis. There is an ulcer and there are atypical spindle cells with areas of dense solar elastosis. A moderate number of the spindle cells stain with antibodies against SOX-10 and S100 predominantly at the periphery. Centrally they do not stain with antibodies against SOX-10 or S100 and stain with antibodies against CD10. They do not stain with antibodies against MART/Melan-A, HMB45, CD31, CD34, CKAE1/AE3, Desmin or p63. They stain with antibodies against p53 and centrally they stain moderately with antibodies against Actin.      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      ** Electronically signed out by Gaurav Cantrell MD **            ECHO 6/2023  Impression  Performed by [facility]  CONCLUSIONS:  - Exam indication: Coronary artery disease involving native heart without angina  pectoris  - The left ventricle is normal in size. Left ventricular systolic function is  normal. EF = 57 ± 5% (2D biplane) Grade I left ventricular diastolic dysfunction.  - The right ventricle is normal in size. Right ventricular systolic function is  normal.  - Estimated right ventricular systolic pressure is likely underestimated due to a  weak or incomplete tricuspid regurgitation signal and is, at least, 20 mmHg  consistent with normal pulmonary artery pressures. Estimated right atrial pressure   is 3 mmHg based on IVC assessment.  - Exam was compared with the prior  echocardiographic exam performed on  4/22/2020. There is no significant change.  Electronically signed by Camden Krishnamurthy MD on 6/20/2023 at 10:16:47 AM       Patient  "Specialist/PCP:     Visit Vitals  /66   Pulse 71   Temp 36.4 °C (97.5 °F) (Temporal)   Resp 16   Ht 1.778 m (5' 10\")   Wt 64.7 kg (142 lb 10.2 oz)   SpO2 100%   BMI 20.47 kg/m²   Smoking Status Former   BSA 1.79 m²       DASI Risk Score      Flowsheet Row [place] [facility] [place]   Can you take care of yourself (eat, dress, bathe, or use toilet)?  2.75 filed at 02/19/2025 1021   Can you walk indoors, such as around your house? 1.75 filed at 02/19/2025 1021   Can you walk a block or two on level ground?  2.75 filed at 02/19/2025 1021   Can you climb a flight of stairs or walk up a hill? 5.5 filed at 02/19/2025 1021   Can you run a short distance? 0 filed at 02/19/2025 1021   Can you do light work around the house like dusting or washing dishes? 2.7 filed at 02/19/2025 1021   Can you do moderate work around the house like vacuuming, sweeping floors or carrying groceries? 3.5 filed at 02/19/2025 1021   Can you do heavy work around the house like scrubbing floors or lifting and moving heavy furniture?  8 filed at 02/19/2025 1021   Can you do yard work like raking leaves, weeding or pushing a mower? 4.5 filed at 02/19/2025 1021   Can you have sexual relations? 5.25 filed at 02/19/2025 1021   Can you participate in moderate recreational activities like golf, bowling, dancing, doubles tennis or throwing a baseball or football? 0 filed at 02/19/2025 1021   Can you participate in strenous sports like swimming, singles tennis, football, basketball, or skiing? 0 filed at 02/19/2025 1021   DASI SCORE 36.7 filed at 02/19/2025 1021   METS Score (Will be calculated only when all the questions are answered) 7.3 filed at 02/19/2025 1021          Caprini DVT Assessment      Flowsheet Row [place] [facility] [place]   DVT Score (IF A SCORE IS NOT CALCULATING, MUST SELECT A BMI TO COMPLETE) 8 filed at 02/19/2025 1020   Medical Factors Present cancer, " chemotherapy, or previous malignancy filed at 02/19/2025 1020   Surgical Factors Major surgery planned, including arthroscopic and laproscopic (1-2 hours) filed at 02/19/2025 1020   BMI (BMI MUST BE CHOSEN) 30 or less filed at 02/19/2025 1020          Modified Frailty Index      Flowsheet Row [facility]-Ad[place] [facility] [place]   Non-independent functional status (problems with dressing, bathing, personal grooming, or cooking) 0 filed at 02/19/2025 1022   History of diabetes mellitus  0 filed at 02/19/2025 1022   History of COPD 0 filed at 02/19/2025 1022   History of CHF No filed at 02/19/2025 1022   History of MI 0 filed at 02/19/2025 1022   History of Percutaneous Coronary Intervention, Cardiac Surgery, or Angina 0.0909 filed at 02/19/2025 1022   Hypertension requiring the use of medication  0.0909 filed at 02/19/2025 1022   Peripheral vascular disease 0 filed at 02/19/2025 1022   Impaired sensorium (cognitive impairement or loss, clouding, or delirium) 0 filed at 02/19/2025 1022   TIA or CVA withouy residual deficit 0 filed at 02/19/2025 1022   Cerebrovascular accident with deficit 0 filed at 02/19/2025 1022   Modified Frailty Index Calculator .1818 filed at 02/19/2025 1022          CHADS2 Stroke Risk  Current as of 17 minutes ago        N/A 3 to 100%: High Risk   2 to < 3%: Medium Risk   0 to < 2%: Low Risk     Last Change: N/A          This score determines the patient's risk of having a stroke if the patient has atrial fibrillation.        This score is not applicable to this patient. Components are not calculated.          Revised Cardiac Risk Index      Flowsheet Row Pre-Admission Testing from [facility]/19/2025 in [facility] [place]   High-Risk Surgery (Intraperitoneal, Intrathoracic,Suprainguinal vascular) 0 filed at 02/19/2025 1021   History of ischemic heart disease (History of MI, History of positive exercuse test, Current chest paint considered due to myocardial ischemia,  Use of nitrate therapy, ECG with pathological Q Waves) 1 filed at 2025 1021   History of congestive heart failure (pulmonary edemia, bilateral rales or S3 gallop, Paroxysmal nocturnal dyspnea, CXR showing pulmonary vascular redistribution) 0 filed at 2025 1021   History of cerebrovascular disease (Prior TIA or stroke) 0 filed at 2025 1021   Pre-operative insulin treatment 0 filed at 2025 1021   Pre-operative creatinine>2 mg/dl 0 filed at 2025 1021   Revised Cardiac Risk Calculator 1 filed at 2025 1021          Apfel Simplified Score      Flowsheet Row Pre-Admission Testing from 2025 in [facility]   Smoking status 1 filed at 2025 1021   History of motion sickness or PONV  0 filed at 2025 1021   Use of postoperative opioids 0 filed at 2025 1021   Gender - Female 0=No filed at 2025 1021   Apfel Simplified Score Calculator 1 filed at 2025 1021          Risk Analysis Index Results This Encounter         2025  1022             Do you live in a place other than your own home?: 0    When did you begin living in the place you are currently residing?: Greater than one year ago    Any kidney failure, kidney not working well, or seeing a kidney doctor (nephrologist)? If yes, was this for kidney stones or another problem?: 0 No    Any history of chronic (long-term) congestive heart failure (CHF)?: 0 No    Any shortness of breath when resting?: 0 No    In the past five years, have you been diagnosed with or treated for cancer?: Yes    During the last 3 months has it become difficult for you to remember things or organize your thoughts?: 0 No    Have you lost weight of 10 pounds or more in the past 3 months without trying?: 0 No    Do you have any loss of appetitie?: 0 No    Getting Around (Mobility): 0 Can get around without help    Eatin Can plan and prepare own meals    Toiletin Can use toilet without any help    Personal Hygiene  (Bathing, Hand Washing, Changing Clothes): 0 Can shower or bathe without any help    SHEN Cancer History: Patient indicates history of cancer    Total Risk Analysis Index Score Without Cancer: 29    Total Risk Analysis Index Score: 38          Stop Bang Score      Flowsheet Row Pre-Admission Testing from 2/19/2025 in [facility]   Do you snore loudly? 0 filed at 02/19/2025 1020   Do you often feel tired or fatigued after your sleep? 0 filed at 02/19/2025 1020   Has anyone ever observed you stop breathing in your sleep? 0 filed at 02/19/2025 1020   Do you have or are you being treated for high blood pressure? 1 filed at 02/19/2025 1020   Recent BMI (Calculated) 20.5 filed at 02/19/2025 1020   Is BMI greater than 35 kg/m2? 0=No filed at 02/19/2025 1020   Age older than 50 years old? 1=Yes filed at 02/19/2025 1020   Gender - Male 1=Yes filed at 02/19/2025 1020          Prodigy: High Risk  Total Score: 24              Prodigy Age Score      Prodigy Gender Score          ARISCAT Score for Postoperative Pulmonary Complications      Flowsheet Row Pre-Admission Testing from 2/19/2025 in [facility]   Age Calculated Score 16 filed at 02/19/2025 1022   Preoperative SpO2 0 filed at 02/19/2025 1022   Respiratory infection in the last month Either upper or lower (i.e., URI, bronchitis, pneumonia), with fever and antibiotic treatment 0 filed at 02/19/2025 1022   Preoperative anemia (Hgb less than 10 g/dl) 0 filed at 02/19/2025 1022   Surgical incision  0 filed at 02/19/2025 1022   Duration of surgery  0 filed at 02/19/2025 1022   Emergency Procedure  0 filed at 02/19/2025 1022   ARISCAT Total Score  16 filed at 02/19/2025 1022          Suh Perioperative Risk for Myocardial Infarction or Cardiac Arrest (FLORIAN)      Flowsheet Row Pre-Admission Testing from 2/19/2025 in [facility]   Calculated Age Score 1.68 filed at 02/19/2025 1023   Functional Status  0 filed at 02/19/2025 1023   ASA  Class  -3.29 filed at 02/19/2025 1023   Creatinine 0 filed at 02/19/2025 1023   Type of Procedure  0.54 filed at 02/19/2025 1023   FLORIAN Total Score  -6.32 filed at 02/19/2025 1023   FLORIAN % 0.18 filed at 02/19/2025 1023            Assessment and Plan:     Plan for OR on 2/27 for   EXCISION, LESION, FACE   Wide local excision of left neck melanoma, sentinel lymph node biopsy, possible skin graft, NUCLEAR MEDICINE INJ AT:  0700 AM [02047 (CPT®)] Left General   BIOPSY, LYMPH NODE, HEAD AND NECK REGION [93440 (CPT®)] Bilateral    SURGICAL PROCUREMENT, GRAFT, SKIN, FULL-THICKNESS, HEAD AND NECK REGION [60784 (CPT®)] Bilateral    Due to melanoma of neck  CMP    HEENT/Airway:  no significant findings on chart review or clinical presentation    Cardiovascular:    Request cardiac clearance  Hx of CABG follows with Dr Cruz [facility]  DASI  Harper Activity Status Index (DASI)  DASI Score: 36.7   MET Score: 7.3  RCRI: 1 point, 6.0% risk for postoperative MACE   FLORIAN: 0.18% risk for postoperative MACE      Pulmonary:  no significant findings on chart review or clinical presentation    Preoperative deep breathing educational handout provided to patient.  ARISCAT: 16     points which is a low (1.6%) risk of in-hospital post-op pulmonary complications   STOP BANG:   3  points which is a intermediate risk for moderate to severe KAILASH    Renal: no significant findings on chart review or clinical presentation    Endocrine:  no significant findings on chart review or clinical presentation    Hematologic:   no significant findings on chart review or clinical presentation  Preoperative DVT educational handout provided to patient.  Caprini Score:  8  points which is a high risk of perioperative VTE    Gastrointestinal:   no significant findings on chart review or clinical presentation  Apfel: 1 points 21% risk for post operative N/V    Infectious disease:  no significant findings on chart review or clinical presentation     Musculoskeletal:  no  acute active issues    Neuro:    No neurologic diagnoses, however, the patient is at an increased risk for post operative delirium secondary to age >/= 65.  Preoperative brain exercise educational handout provided to patient.    The patient is at an increased risk for perioperative stroke secondary to cardiac disease, increased age, HTN, HLD, and general anesthesia.

## 2025-02-26 ENCOUNTER — TELEPHONE (OUTPATIENT)
Facility: CLINIC | Age: 85
End: 2025-02-26
Payer: MEDICARE

## 2025-02-26 NOTE — TELEPHONE ENCOUNTER
Spoke with Venessa to review appt details for To tomorrow and they are aware of arrival time at radiology in the morning at 7am and surgery will follow. We discussed periop details and NPO status overnight, all questions answered.

## 2025-02-27 ENCOUNTER — HOSPITAL ENCOUNTER (OUTPATIENT)
Dept: RADIOLOGY | Facility: HOSPITAL | Age: 85
Setting detail: OUTPATIENT SURGERY
Discharge: HOME | End: 2025-02-27
Payer: MEDICARE

## 2025-02-27 ENCOUNTER — PHARMACY VISIT (OUTPATIENT)
Dept: PHARMACY | Facility: CLINIC | Age: 85
End: 2025-02-27
Payer: MEDICARE

## 2025-02-27 ENCOUNTER — HOSPITAL ENCOUNTER (OUTPATIENT)
Facility: HOSPITAL | Age: 85
Setting detail: OUTPATIENT SURGERY
Discharge: HOME | End: 2025-02-27
Attending: OTOLARYNGOLOGY | Admitting: OTOLARYNGOLOGY
Payer: MEDICARE

## 2025-02-27 ENCOUNTER — ANESTHESIA (OUTPATIENT)
Dept: OPERATING ROOM | Facility: HOSPITAL | Age: 85
End: 2025-02-27
Payer: MEDICARE

## 2025-02-27 ENCOUNTER — ANESTHESIA EVENT (OUTPATIENT)
Dept: OPERATING ROOM | Facility: HOSPITAL | Age: 85
End: 2025-02-27
Payer: MEDICARE

## 2025-02-27 VITALS
SYSTOLIC BLOOD PRESSURE: 158 MMHG | HEIGHT: 71 IN | TEMPERATURE: 97.5 F | WEIGHT: 145 LBS | BODY MASS INDEX: 20.3 KG/M2 | DIASTOLIC BLOOD PRESSURE: 58 MMHG | OXYGEN SATURATION: 96 % | HEART RATE: 71 BPM | RESPIRATION RATE: 18 BRPM

## 2025-02-27 DIAGNOSIS — C43.4 MELANOMA OF NECK (MULTI): ICD-10-CM

## 2025-02-27 DIAGNOSIS — G89.18 ACUTE POSTOPERATIVE PAIN: Primary | ICD-10-CM

## 2025-02-27 PROCEDURE — 78803 RP LOCLZJ TUM SPECT 1 AREA: CPT

## 2025-02-27 PROCEDURE — 7100000002 HC RECOVERY ROOM TIME - EACH INCREMENTAL 1 MINUTE: Performed by: OTOLARYNGOLOGY

## 2025-02-27 PROCEDURE — 78830 RP LOCLZJ TUM SPECT W/CT 1: CPT

## 2025-02-27 PROCEDURE — 2500000005 HC RX 250 GENERAL PHARMACY W/O HCPCS: Performed by: OTOLARYNGOLOGY

## 2025-02-27 PROCEDURE — 2720000007 HC OR 272 NO HCPCS: Performed by: OTOLARYNGOLOGY

## 2025-02-27 PROCEDURE — A9520 TC99 TILMANOCEPT DIAG 0.5MCI: HCPCS | Performed by: OTOLARYNGOLOGY

## 2025-02-27 PROCEDURE — 11646 EXC F/E/E/N/L MAL+MRG >4 CM: CPT | Performed by: OTOLARYNGOLOGY

## 2025-02-27 PROCEDURE — RXMED WILLOW AMBULATORY MEDICATION CHARGE

## 2025-02-27 PROCEDURE — A42410 PR EXC PAROTD LESN,LATER LOBE: Performed by: ANESTHESIOLOGIST ASSISTANT

## 2025-02-27 PROCEDURE — 2500000005 HC RX 250 GENERAL PHARMACY W/O HCPCS: Performed by: ANESTHESIOLOGY

## 2025-02-27 PROCEDURE — 3600000004 HC OR TIME - INITIAL BASE CHARGE - PROCEDURE LEVEL FOUR: Performed by: OTOLARYNGOLOGY

## 2025-02-27 PROCEDURE — 2500000004 HC RX 250 GENERAL PHARMACY W/ HCPCS (ALT 636 FOR OP/ED): Performed by: OTOLARYNGOLOGY

## 2025-02-27 PROCEDURE — 38510 BIOPSY/REMOVAL LYMPH NODES: CPT | Performed by: OTOLARYNGOLOGY

## 2025-02-27 PROCEDURE — 7100000009 HC PHASE TWO TIME - INITIAL BASE CHARGE: Performed by: OTOLARYNGOLOGY

## 2025-02-27 PROCEDURE — 42410 EXCISE PAROTID GLAND/LESION: CPT | Performed by: OTOLARYNGOLOGY

## 2025-02-27 PROCEDURE — 7100000001 HC RECOVERY ROOM TIME - INITIAL BASE CHARGE: Performed by: OTOLARYNGOLOGY

## 2025-02-27 PROCEDURE — 3700000002 HC GENERAL ANESTHESIA TIME - EACH INCREMENTAL 1 MINUTE: Performed by: OTOLARYNGOLOGY

## 2025-02-27 PROCEDURE — A42410 PR EXC PAROTD LESN,LATER LOBE: Performed by: ANESTHESIOLOGY

## 2025-02-27 PROCEDURE — 99100 ANES PT EXTEME AGE<1 YR&>70: CPT | Performed by: ANESTHESIOLOGY

## 2025-02-27 PROCEDURE — 3600000009 HC OR TIME - EACH INCREMENTAL 1 MINUTE - PROCEDURE LEVEL FOUR: Performed by: OTOLARYNGOLOGY

## 2025-02-27 PROCEDURE — 3430000001 HC RX 343 DIAGNOSTIC RADIOPHARMACEUTICALS: Performed by: OTOLARYNGOLOGY

## 2025-02-27 PROCEDURE — 3700000001 HC GENERAL ANESTHESIA TIME - INITIAL BASE CHARGE: Performed by: OTOLARYNGOLOGY

## 2025-02-27 PROCEDURE — 7100000010 HC PHASE TWO TIME - EACH INCREMENTAL 1 MINUTE: Performed by: OTOLARYNGOLOGY

## 2025-02-27 PROCEDURE — 2500000004 HC RX 250 GENERAL PHARMACY W/ HCPCS (ALT 636 FOR OP/ED): Performed by: ANESTHESIOLOGIST ASSISTANT

## 2025-02-27 PROCEDURE — 2500000004 HC RX 250 GENERAL PHARMACY W/ HCPCS (ALT 636 FOR OP/ED): Performed by: ANESTHESIOLOGY

## 2025-02-27 RX ORDER — ACETAMINOPHEN 325 MG/1
975 TABLET ORAL ONCE
Status: DISCONTINUED | OUTPATIENT
Start: 2025-02-27 | End: 2025-02-27 | Stop reason: HOSPADM

## 2025-02-27 RX ORDER — ALBUTEROL SULFATE 0.83 MG/ML
2.5 SOLUTION RESPIRATORY (INHALATION) ONCE AS NEEDED
Status: DISCONTINUED | OUTPATIENT
Start: 2025-02-27 | End: 2025-02-27 | Stop reason: HOSPADM

## 2025-02-27 RX ORDER — CEFAZOLIN SODIUM 2 G/100ML
INJECTION, SOLUTION INTRAVENOUS AS NEEDED
Status: DISCONTINUED | OUTPATIENT
Start: 2025-02-27 | End: 2025-02-27

## 2025-02-27 RX ORDER — TRAMADOL HYDROCHLORIDE 50 MG/1
50 TABLET ORAL EVERY 8 HOURS PRN
Qty: 9 TABLET | Refills: 0 | Status: SHIPPED | OUTPATIENT
Start: 2025-02-27 | End: 2025-03-02

## 2025-02-27 RX ORDER — FENTANYL CITRATE 50 UG/ML
12.5 INJECTION, SOLUTION INTRAMUSCULAR; INTRAVENOUS EVERY 5 MIN PRN
Status: DISCONTINUED | OUTPATIENT
Start: 2025-02-27 | End: 2025-02-27 | Stop reason: HOSPADM

## 2025-02-27 RX ORDER — LIDOCAINE HYDROCHLORIDE 10 MG/ML
0.1 INJECTION, SOLUTION INFILTRATION; PERINEURAL ONCE
Status: DISCONTINUED | OUTPATIENT
Start: 2025-02-27 | End: 2025-02-27 | Stop reason: HOSPADM

## 2025-02-27 RX ORDER — OXYCODONE HYDROCHLORIDE 5 MG/1
5 TABLET ORAL EVERY 4 HOURS PRN
Status: DISCONTINUED | OUTPATIENT
Start: 2025-02-27 | End: 2025-02-27 | Stop reason: HOSPADM

## 2025-02-27 RX ORDER — MIDAZOLAM HYDROCHLORIDE 1 MG/ML
1 INJECTION, SOLUTION INTRAMUSCULAR; INTRAVENOUS ONCE AS NEEDED
Status: DISCONTINUED | OUTPATIENT
Start: 2025-02-27 | End: 2025-02-27 | Stop reason: HOSPADM

## 2025-02-27 RX ORDER — SUCCINYLCHOLINE/SOD CL,ISO/PF 100 MG/5ML
SYRINGE (ML) INTRAVENOUS AS NEEDED
Status: DISCONTINUED | OUTPATIENT
Start: 2025-02-27 | End: 2025-02-27

## 2025-02-27 RX ORDER — FENTANYL CITRATE 50 UG/ML
INJECTION, SOLUTION INTRAMUSCULAR; INTRAVENOUS AS NEEDED
Status: DISCONTINUED | OUTPATIENT
Start: 2025-02-27 | End: 2025-02-27

## 2025-02-27 RX ORDER — ONDANSETRON HYDROCHLORIDE 2 MG/ML
INJECTION, SOLUTION INTRAVENOUS AS NEEDED
Status: DISCONTINUED | OUTPATIENT
Start: 2025-02-27 | End: 2025-02-27

## 2025-02-27 RX ORDER — SODIUM CHLORIDE, SODIUM LACTATE, POTASSIUM CHLORIDE, CALCIUM CHLORIDE 600; 310; 30; 20 MG/100ML; MG/100ML; MG/100ML; MG/100ML
100 INJECTION, SOLUTION INTRAVENOUS CONTINUOUS
Status: DISCONTINUED | OUTPATIENT
Start: 2025-02-27 | End: 2025-02-27 | Stop reason: HOSPADM

## 2025-02-27 RX ORDER — HYDROMORPHONE HYDROCHLORIDE 1 MG/ML
1 INJECTION, SOLUTION INTRAMUSCULAR; INTRAVENOUS; SUBCUTANEOUS EVERY 5 MIN PRN
Status: DISCONTINUED | OUTPATIENT
Start: 2025-02-27 | End: 2025-02-27 | Stop reason: HOSPADM

## 2025-02-27 RX ORDER — HYDRALAZINE HYDROCHLORIDE 20 MG/ML
5 INJECTION INTRAMUSCULAR; INTRAVENOUS EVERY 30 MIN PRN
Status: DISCONTINUED | OUTPATIENT
Start: 2025-02-27 | End: 2025-02-27 | Stop reason: HOSPADM

## 2025-02-27 RX ORDER — GLYCOPYRROLATE 0.2 MG/ML
INJECTION INTRAMUSCULAR; INTRAVENOUS AS NEEDED
Status: DISCONTINUED | OUTPATIENT
Start: 2025-02-27 | End: 2025-02-27

## 2025-02-27 RX ORDER — PHENYLEPHRINE HCL IN 0.9% NACL 1 MG/10 ML
SYRINGE (ML) INTRAVENOUS AS NEEDED
Status: DISCONTINUED | OUTPATIENT
Start: 2025-02-27 | End: 2025-02-27

## 2025-02-27 RX ORDER — PROPOFOL 10 MG/ML
INJECTION, EMULSION INTRAVENOUS AS NEEDED
Status: DISCONTINUED | OUTPATIENT
Start: 2025-02-27 | End: 2025-02-27

## 2025-02-27 RX ORDER — MEPERIDINE HYDROCHLORIDE 50 MG/ML
12.5 INJECTION INTRAMUSCULAR; INTRAVENOUS; SUBCUTANEOUS EVERY 10 MIN PRN
Status: DISCONTINUED | OUTPATIENT
Start: 2025-02-27 | End: 2025-02-27 | Stop reason: HOSPADM

## 2025-02-27 RX ORDER — BACITRACIN 500 [USP'U]/G
OINTMENT TOPICAL AS NEEDED
Status: DISCONTINUED | OUTPATIENT
Start: 2025-02-27 | End: 2025-02-27 | Stop reason: HOSPADM

## 2025-02-27 RX ORDER — PHENYLEPHRINE 10 MG/250 ML(40 MCG/ML)IN 0.9 % SOD.CHLORIDE INTRAVENOUS
CONTINUOUS PRN
Status: DISCONTINUED | OUTPATIENT
Start: 2025-02-27 | End: 2025-02-27

## 2025-02-27 RX ORDER — LIDOCAINE HYDROCHLORIDE 20 MG/ML
INJECTION, SOLUTION EPIDURAL; INFILTRATION; INTRACAUDAL; PERINEURAL AS NEEDED
Status: DISCONTINUED | OUTPATIENT
Start: 2025-02-27 | End: 2025-02-27

## 2025-02-27 RX ORDER — ONDANSETRON HYDROCHLORIDE 2 MG/ML
4 INJECTION, SOLUTION INTRAVENOUS ONCE AS NEEDED
Status: DISCONTINUED | OUTPATIENT
Start: 2025-02-27 | End: 2025-02-27 | Stop reason: HOSPADM

## 2025-02-27 RX ORDER — DIPHENHYDRAMINE HYDROCHLORIDE 50 MG/ML
12.5 INJECTION INTRAMUSCULAR; INTRAVENOUS ONCE AS NEEDED
Status: DISCONTINUED | OUTPATIENT
Start: 2025-02-27 | End: 2025-02-27 | Stop reason: HOSPADM

## 2025-02-27 RX ADMIN — LIDOCAINE HYDROCHLORIDE 60 MG: 20 INJECTION, SOLUTION EPIDURAL; INFILTRATION; INTRACAUDAL; PERINEURAL at 11:10

## 2025-02-27 RX ADMIN — Medication 200 MCG: at 11:39

## 2025-02-27 RX ADMIN — FENTANYL CITRATE 12.5 MCG: 50 INJECTION, SOLUTION INTRAMUSCULAR; INTRAVENOUS at 13:36

## 2025-02-27 RX ADMIN — FENTANYL CITRATE 12.5 MCG: 50 INJECTION, SOLUTION INTRAMUSCULAR; INTRAVENOUS at 13:48

## 2025-02-27 RX ADMIN — Medication 100 MCG: at 11:33

## 2025-02-27 RX ADMIN — FENTANYL CITRATE 12.5 MCG: 50 INJECTION, SOLUTION INTRAMUSCULAR; INTRAVENOUS at 13:59

## 2025-02-27 RX ADMIN — GLYCOPYRROLATE 0.2 MG: 0.2 INJECTION, SOLUTION INTRAMUSCULAR; INTRAVENOUS at 11:24

## 2025-02-27 RX ADMIN — Medication 0.99 MILLICURIE: at 07:25

## 2025-02-27 RX ADMIN — ONDANSETRON 4 MG: 2 INJECTION INTRAMUSCULAR; INTRAVENOUS at 12:01

## 2025-02-27 RX ADMIN — PROPOFOL 20 MG: 10 INJECTION, EMULSION INTRAVENOUS at 11:27

## 2025-02-27 RX ADMIN — DEXAMETHASONE SODIUM PHOSPHATE 4 MG: 4 INJECTION, SOLUTION INTRAMUSCULAR; INTRAVENOUS at 11:25

## 2025-02-27 RX ADMIN — Medication 6 L/MIN: at 12:30

## 2025-02-27 RX ADMIN — FENTANYL CITRATE 50 MCG: 50 INJECTION, SOLUTION INTRAMUSCULAR; INTRAVENOUS at 11:10

## 2025-02-27 RX ADMIN — FENTANYL CITRATE 12.5 MCG: 50 INJECTION, SOLUTION INTRAMUSCULAR; INTRAVENOUS at 13:31

## 2025-02-27 RX ADMIN — SODIUM CHLORIDE, POTASSIUM CHLORIDE, SODIUM LACTATE AND CALCIUM CHLORIDE: 600; 310; 30; 20 INJECTION, SOLUTION INTRAVENOUS at 11:10

## 2025-02-27 RX ADMIN — PROPOFOL 100 MG: 10 INJECTION, EMULSION INTRAVENOUS at 11:10

## 2025-02-27 RX ADMIN — CEFAZOLIN SODIUM 2 G: 2 INJECTION, SOLUTION INTRAVENOUS at 11:18

## 2025-02-27 RX ADMIN — Medication 200 MCG: at 11:24

## 2025-02-27 RX ADMIN — Medication 60 MG: at 11:10

## 2025-02-27 RX ADMIN — Medication 0.3 MCG/KG/MIN: at 11:44

## 2025-02-27 ASSESSMENT — PAIN - FUNCTIONAL ASSESSMENT
PAIN_FUNCTIONAL_ASSESSMENT: 0-10

## 2025-02-27 ASSESSMENT — PAIN DESCRIPTION - DESCRIPTORS: DESCRIPTORS: PRESSURE

## 2025-02-27 ASSESSMENT — COLUMBIA-SUICIDE SEVERITY RATING SCALE - C-SSRS
1. IN THE PAST MONTH, HAVE YOU WISHED YOU WERE DEAD OR WISHED YOU COULD GO TO SLEEP AND NOT WAKE UP?: NO
2. HAVE YOU ACTUALLY HAD ANY THOUGHTS OF KILLING YOURSELF?: NO
6. HAVE YOU EVER DONE ANYTHING, STARTED TO DO ANYTHING, OR PREPARED TO DO ANYTHING TO END YOUR LIFE?: NO

## 2025-02-27 ASSESSMENT — PAIN SCALES - GENERAL
PAINLEVEL_OUTOF10: 4
PAINLEVEL_OUTOF10: 4
PAINLEVEL_OUTOF10: 2
PAINLEVEL_OUTOF10: 4
PAINLEVEL_OUTOF10: 0 - NO PAIN
PAINLEVEL_OUTOF10: 2
PAINLEVEL_OUTOF10: 4
PAINLEVEL_OUTOF10: 4
PAINLEVEL_OUTOF10: 0 - NO PAIN
PAINLEVEL_OUTOF10: 0 - NO PAIN

## 2025-02-27 NOTE — ANESTHESIA PREPROCEDURE EVALUATION
Patient: To Parnell    Procedure Information       Date/Time: 02/27/25 1050    Procedures:       EXCISION, LESION, FACE   Wide local excision of left neck melanoma, sentinel lymph node biopsy, possible skin graft, NUCLEAR MEDICINE INJ AT:  0700 AM (Left)      BIOPSY, LYMPH NODE, HEAD AND NECK REGION (Bilateral)      SURGICAL PROCUREMENT, GRAFT, SKIN, FULL-THICKNESS, HEAD AND NECK REGION (Bilateral)    Location: [facility] OR 05 / Virtual [facility] OR    Surgeons: Andrew Drummond MD            Relevant Problems   No relevant active problems       Clinical information reviewed:    Allergies  Meds               NPO Detail:  No data recorded     Physical Exam    Airway  Mallampati: II  TM distance: >3 FB  Neck ROM: full     Cardiovascular - normal exam     Dental    Pulmonary - normal exam     Abdominal - normal exam             Anesthesia Plan    History of general anesthesia?: yes  History of complications of general anesthesia?: no    ASA 3     general   (Hx CAD with CABG, doing well recently)  intravenous induction   Anesthetic plan and risks discussed with patient.    Plan discussed with CRNA.

## 2025-02-27 NOTE — DISCHARGE INSTRUCTIONS
DEPARTMENT OF OTOLARYNGOLOGY (ENT)  DISCHARGE INSTRUCTIONS    C O N F I D E N T I A L   I N F O R M A T I O N  -------------------------------------------------------------------------------------------------------------------    To Parnell  [id number]    The following is a brief overview of your hospitalization. Some of the information contained on this summary may be confidential.  This information should be kept in your records and should be shared with your regular doctor.    Admission Date:2/27/2025  9:54 AM  Discharge Date: No discharge date for patient encounter.    Disposition: Home    PRINCIPAL DIAGNOSIS (reason after study for this admission): Melanoma of the left neck    Physicians:   Dr. Drummond    Operations performed while hospitalized:   Wide local excision of left neck melanoma and sentinel lymph node biopsy.      Treatment/wound care:   Keep area(s) clean and dry.   It is okay to shower 24 hours after time of surgery.    Do not scrub wound(s), pat dry.    Do not submerge wound(s) in standing water until seen in followup (no tub bathing, swimming, or hot tubs).    Please visually inspect your wound(s) at least once daily.  If the wound(s) are in a difficult to see location, please use a mirror or have someone else assist with visual inspection.  Clean incisions gently with soap and water.  Pat area dry after showering.  Apply thin layer of Vaseline twice daily      Pain Control:    Adequate management:   tramadol can be taken as prescribed as needed for breakthrough pain.    Tylenol and ibuprofen can also be used for pain control    Activity after Discharge:   No heavy lifting, weight bearing as tolerated, no driving until mobility is returned to normal.   Do not submerge wound(s) in standing water for 7 days after surgery (no tub bathing, swimming, or hot tubs).   Do not drive or operate heavy machinery while taking narcotic pain medications as these medications can alter perception, impair  judgement, and slow reaction times.    Diet: Regular    Call Provider if:  Breathing faster than normal  Breathing harder than normal or having retractions  Fever of 100.4 (38 C) or higher  Temperature is greater than 102 degrees  Chills  Drinking less than normal  Not being able to go 4-6 hours between albuterol treatments  Urinating less than normal, over 1 day  Acting very sleepy and difficult to awaken  Vomiting (throwing up) and not able to eat or drink for 12 hours  3 or more loose, watery bowel movements in 24 hours (diarrhea)  Any new concerning symptoms    Additional Instructions:   You can contact my  during daytime hours at [phone].  After hours or on the weekends you can call [phone] and ask for the ENT resident on-call

## 2025-02-27 NOTE — ANESTHESIA PROCEDURE NOTES
Airway  Date/Time: 2/27/2025 11:13 AM  Urgency: elective    Airway not difficult    Staffing  Performed: LAVONNE   Authorized by: Steven Rhodes DO    Performed by: LAVONNE Kevin  Patient location during procedure: OR    Indications and Patient Condition  Indications for airway management: anesthesia  Spontaneous Ventilation: absent  Sedation level: deep  Preoxygenated: yes  Patient position: sniffing  Mask difficulty assessment: 1 - vent by mask  Planned trial extubation    Final Airway Details  Final airway type: endotracheal airway      Successful airway: ETT  Cuffed: yes   Successful intubation technique: direct laryngoscopy  Facilitating devices/methods: intubating stylet  Blade: Naseem  Blade size: #4  ETT size (mm): 7.0  Cormack-Lehane Classification: grade I - full view of glottis  Placement verified by: chest auscultation and capnometry   Measured from: lips  ETT to lips (cm): 23  Number of attempts at approach: 1

## 2025-02-27 NOTE — OP NOTE
EXCISION, LESION, FACE   Wide local excision of left neck melanoma, sentinel lymph node biopsy, possible skin graft, NUCLEAR MEDICINE INJ AT:  0700 AM (L), BIOPSY, LYMPH NODE, HEAD AND NECK REGION (B), EXCISION, PAROTID GLAND (L) Operative Note     Date: 2025  OR Location: [facility] OR    Name: To Parnell, : 1940, Age: 84 y.o., MRN: [id number], Sex: male    Diagnosis  Pre-op Diagnosis      * Melanoma of neck (Multi) [C43.4] Post-op Diagnosis     * Melanoma of neck (Multi) [C43.4]     Procedures  EXCISION, LESION, FACE   Wide local excision of left neck melanoma, sentinel lymph node biopsy, possible skin graft, NUCLEAR MEDICINE INJ AT:  0700 AM  [address] - [place] EXCISION MALIGNANT LESION F/E/E/N/L >4.0 CM    BIOPSY, LYMPH NODE, HEAD AND NECK REGION  49529 - [place] BX/EXC LYMPH NODE OPEN DEEP CERVICAL NODE    EXCISION, PAROTID GLAND  85416 - [place] EXC PRTD ANALISA/PRTD GLND LAT LOBE W/O NRV DSJ      Surgeons      * Andrew Drummond - Primary    Resident/Fellow/Other Assistant:  Surgeons and Role:  * No surgeons found with a matching role *    Staff:   Surgical Assistant:   Saharaulator: Ava  Circulator: Kiera Long Person: Rubi Ward Scrub: Fawad Ward Scrub: Santy    Anesthesia Staff: Anesthesiologist: Steven Rhodes DO  C-AA: LAVONNE Kevin    Procedure Summary  Anesthesia: Anesthesia type not filed in the log.  ASA: III  Estimated Blood Loss: 5 mL  Intra-op Medications:   Administrations occurring from 1050 to 1345 on 25:   Medication Name Total Dose   bacitracin ointment 1 Application   lidocaine-epinephrine PF (Xylocaine W/EPI) 1 %-1:200,000 injection 10 mL   ceFAZolin (Ancef) IV 2 g in 100 mL dextrose (iso) - premix 2 g   dexAMETHasone (Decadron) injection 4 mg/mL 4 mg   fentaNYL (Sublimaze) injection 50 mcg/mL 50 mcg   glycopyrrolate (Robinul) injection 0.2 mg   LR bolus Cannot be calculated   lidocaine PF (Xylocaine-MPF) local injection 2 % 60 mg   ondansetron (Zofran) 2 mg/mL injection 4 mg    phenylephrine (Julian-Synephrine) 10 mg/250 mL NS (40 mcg/mL) infusion 1.04 mg   phenylephrine 100 mcg/mL syringe 10 mL (prefilled) 500 mcg   propofol (Diprivan) injection 10 mg/mL 120 mg   succinylcholine PF in sodium chloride IV syringe 60 mg              Anesthesia Record               Intraprocedure I/O Totals          Intake    Phenylephrine Drip 0.00 mL    The total shown is the total volume documented since Anesthesia Start was filed.    Total Intake 0 mL          Specimen:   ID Type Source Tests Collected by Time   1 : WIDE LOCAL EXCISION OF LEFT NECK, SHORT STITCH SUPERIOR APEX, LONG STITCH ANTERIOR Tissue SKIN WIDE EXCISION DERMPATH LAB- DERMATOPATHOLOGY Andrew Drummond MD 2/27/2025 1142   2 : SENTINEL LYMPH NODE #1 LEFT INFERIOR PAROTID Tissue SENTINEL LYMPH NODE OTHER DERMPATH LAB- DERMATOPATHOLOGY Andrew Drummond MD 2/27/2025 1204   3 : SENTIMEL NLYMP NODE #2 LEFT INFERIOR PAROTID Tissue SENTINEL LYMPH NODE OTHER DERMPATH LAB- DERMATOPATHOLOGY Andrew Drummond MD 2/27/2025 1206   A :   SENTINEL LYMPH NODE MELANOMA  Andrew Drummond MD 2/27/2025 1122          Findings: Melanoma excised with 2 cm margins.  Ellipse of skin was oriented vertically.  2 separate sentinel lymph nodes were encountered.  [place] lymph node #1 was abutting the inferior parotid gland.  [place] lymph node #2 was within the substance of the parotid gland    Indications: To Parnell is an 84 y.o. male who is having surgery for Melanoma of neck (Multi) [C43.4].     The patient was seen in the preoperative area. The risks, benefits, complications, treatment options, non-operative alternatives, expected recovery and outcomes were discussed with the patient. The possibilities of reaction to medication, pulmonary aspiration, injury to surrounding structures, bleeding, recurrent infection, the need for additional procedures, failure to diagnose a condition, and creating a complication requiring transfusion or operation were discussed  with the patient. The patient concurred with the proposed plan, giving informed consent.  The site of surgery was properly noted/marked if necessary per policy. The patient has been actively warmed in preoperative area. Preoperative antibiotics have been ordered and given within 1 hours of incision. Venous thrombosis prophylaxis have been ordered including bilateral sequential compression devices    Procedure Details: Patient was taken back to the operating room laid supine on the table.  Timeout was performed, general anesthesia induced, patient was intubated.  The table was turned and the left face and neck were exposed.  The melanoma was marked and 2 cm margins were marked circumferentially.  Next an ellipse of skin was designed vertically to allow for primary closure.  The face and neck were then prepped and draped in sterile fashion.  A 15 blade was used to make the skin incision down into the subcutaneous fat.  Bovie cautery was then used to dissect deep to the platysma muscle.  The platysma is served as our deep margin and this was elevated away from the external jugular vein and the great auricular nerve.  This was continued circumferentially until the main specimen was removed.  It was then oriented for pathology.  The measuring greater than 4 cm in size.    Next I turned my attention to the sentinel lymph node biopsy.  A subplatysmal flap was elevated over the parotid fascia superiorly.  The first sentinel lymph node biopsy was just anterior to the external jugular vein and abutting the inferior parotid gland.  This was dissected circumferentially and showed activity on the gamma probe.  Neck some additional activity was identified in the substance of the tail of the parotid.  I could see the lower division of the facial nerve near the angle of the mandible anteriorly.  This was superficial to the nerve and did not require any to dissect the nerve through the gland.  The node was circumferentially  dissected and removed from the tail of the parotid.  Next the inferior parotid lateral neck was closely inspected with the probe and there is no further activity with the gamma probe.  A small amount of fibrillar was placed near the tail of the parotid.  The wound was copiously irrigated.  Hemostasis was achieved.  The incision was then closed in layers using 3-0 Vicryl for the deep layer and 4-0 Monocryl for the skin.  Patient tolerated the procedure well  Complications: None  Disposition: PACU - hemodynamically stable.  Condition: stable     Wide Local Excision for Primary Cutaneous Melanoma  Operation performed with curative intent Yes   Original Breslow thickness of the lesion 2.1 mm (to the tenth of a millimeter)   Clinical margin width 2 cm   Depth of excision Full-thickness skin/subcutaneous tissue down to fascia (melanoma)         Andrew Drummond  Phone Number: [phone]

## 2025-02-27 NOTE — ANESTHESIA POSTPROCEDURE EVALUATION
Patient: To Parnell    Procedure Summary       Date: 02/27/25 Room / Location: [facility] OR 05 / Virtual [facility] OR    Anesthesia Start: 1103 Anesthesia Stop: 1234    Procedures:       EXCISION, LESION, FACE   Wide local excision of left neck melanoma, sentinel lymph node biopsy, possible skin graft, NUCLEAR MEDICINE INJ AT:  0700 AM (Left: Neck)      BIOPSY, LYMPH NODE, HEAD AND NECK REGION (Bilateral: Neck)      EXCISION, PAROTID GLAND (Left: Neck) Diagnosis:       Melanoma of neck (Multi)      (Melanoma of neck (Multi) [C43.4])    Surgeons: Andrew Drummond MD Responsible Provider: Steven Rhodes DO    Anesthesia Type: general ASA Status: 3            Anesthesia Type: general    Vitals Value Taken Time   /64 02/27/25 1300   Temp 36.2 °C (97.2 °F) 02/27/25 1230   Pulse 65 02/27/25 1307   Resp 18 02/27/25 1307   SpO2 100 % 02/27/25 1307   Vitals shown include unfiled device data.    Anesthesia Post Evaluation    Patient location during evaluation: PACU  Patient participation: complete - patient participated  Level of consciousness: awake and alert  Pain management: adequate  Airway patency: patent  Cardiovascular status: acceptable  Respiratory status: acceptable  Hydration status: acceptable  Postoperative Nausea and Vomiting: none        No notable events documented.

## 2025-03-04 ENCOUNTER — TELEPHONE (OUTPATIENT)
Dept: OTOLARYNGOLOGY | Facility: HOSPITAL | Age: 85
End: 2025-03-04
Payer: MEDICARE

## 2025-03-04 LAB
CLINICAL SIG UPDATED INFO: NORMAL
LAB AP ASR DISCLAIMER: NORMAL
LABORATORY COMMENT REPORT: NORMAL
PATH REPORT.FINAL DX SPEC: NORMAL
PATH REPORT.FINAL DX SPEC: NORMAL
PATH REPORT.GROSS SPEC: NORMAL
PATH REPORT.GROSS SPEC: NORMAL
PATH REPORT.MICROSCOPIC SPEC OTHER STN: NORMAL
PATH REPORT.RELEVANT HX SPEC: NORMAL
PATH REPORT.RELEVANT HX SPEC: NORMAL
PATH REPORT.TOTAL CANCER: NORMAL
PATH REPORT.TOTAL CANCER: NORMAL
PATHOLOGY SYNOPTIC REPORT: NORMAL
PATHOLOGY SYNOPTIC REPORT: NORMAL

## 2025-03-04 NOTE — TELEPHONE ENCOUNTER
"Spoke with Mr. Parnell to review incision care, all questions answered and he is doing well post op. Has some \"soreness\" but denies pain, eating and drinking well, has no concerns. We set up a pov for 3/12 and I encouraged him to call with any other questions or concerns prior to the appt.  "

## 2025-03-07 DIAGNOSIS — C43.4 MELANOMA OF NECK (MULTI): Primary | ICD-10-CM

## 2025-03-12 ENCOUNTER — APPOINTMENT (OUTPATIENT)
Facility: CLINIC | Age: 85
End: 2025-03-12
Payer: MEDICARE

## 2025-03-12 VITALS — WEIGHT: 143 LBS | BODY MASS INDEX: 19.94 KG/M2

## 2025-03-12 DIAGNOSIS — C43.4 MELANOMA OF NECK (MULTI): Primary | ICD-10-CM

## 2025-03-12 PROCEDURE — 99024 POSTOP FOLLOW-UP VISIT: CPT | Performed by: OTOLARYNGOLOGY

## 2025-03-12 PROCEDURE — 1036F TOBACCO NON-USER: CPT | Performed by: OTOLARYNGOLOGY

## 2025-03-12 PROCEDURE — 1159F MED LIST DOCD IN RCRD: CPT | Performed by: OTOLARYNGOLOGY

## 2025-03-12 PROCEDURE — 1160F RVW MEDS BY RX/DR IN RCRD: CPT | Performed by: OTOLARYNGOLOGY

## 2025-03-12 NOTE — PROGRESS NOTES
ENT Outpatient Consultation    Chief Complaint: Melanoma  History Of Present Illness  To Parnell is a 84 y.o. male presents for evaluation of left neck melanoma. 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 Notes having some skin lesions frozen off in the past but denies personal history of skin cancer nor familial history of melanoma. No prior ENT surgeries. No symptomatic complaints otherwise since biopsy. Denies issues with neck pain/swelling or weight loss. Has history of heart surgery 10 years ago, on baby aspirin now. Retired . Here with wife.    3/12/25: Patient returns for postop visit.  Underwent wide local excision and sentinel lymph node biopsy.  Final pathology returned with 3.4 mm deep melanoma with negative margins.  There are 2 sentinel lymph nodes that were both negative     Past Medical History  He has a past medical history of Anxiety, Coronary artery disease, ED (erectile dysfunction), Hearing aid worn, HL (hearing loss), [place] (hard of hearing), Hyperlipidemia, Hypertension, and Melanoma (Multi).    Surgical History  He has a past surgical history that includes Coronary artery bypass graft (2013).     Social History  He reports that he has quit smoking. His smoking use included cigarettes. He has never used smokeless tobacco. He reports that he does not currently use alcohol. He reports that he does not use drugs.    Family History  Family History   Problem Relation Name Age of Onset    Dementia Sister          Allergies  Amoxicillin, Penicillins, and Ramipril     Physical Exam:  Left neck incision well-approximated with no concern for fluid collection or infection     Last Recorded Vitals  Weight 64.9 kg (143 lb).    Relevant Results  Reviewed final pathology    Assessment and Plan  84 y.o. male with new desmoplastic melanoma of  left inferolateral neck status post wide local excision and sentinel lymph node biopsy.  [place] lymph node returned as negative.  Final depth on melanoma 3.4 mm.  Stage IIb    -We reviewed pathology and we will plan to review at the upcoming tumor board  -Discussed need for evaluation by medical oncology  -He would like a referral to  dermatology  -follow up in 2-3 months    Andrew Drummond MD

## 2025-03-17 ENCOUNTER — TUMOR BOARD CONFERENCE (OUTPATIENT)
Dept: HEMATOLOGY/ONCOLOGY | Facility: HOSPITAL | Age: 85
End: 2025-03-17
Payer: MEDICARE

## 2025-03-17 NOTE — TUMOR BOARD NOTE
General Patient Information  Name:  To Parnell  Evaluation #:  2  Conference Date:  3/17/2025  YOB: 1940  MRN:  [id number]  Program Physician(s):  Fermin Bautista  Referring Physician(s):  Jann Valenzuela, Tanya Mercedes/Bo Cid      Summary   Stage: cIIB (iE2plYDS2)   Melanoma 5 year survival: 87%    Assessment:  Left inferior lateral neck with an ulcerated melanoma, with features of desmoplastic and a dedifferentiated melanoma, Breslow at least 2.2 mm, broadly transected on the deep margin    S/p WLE with 2 cm margins and SLNB, showing melanoma extending to a depth of 3.4 mm, margins clear. SLNB shows 2 benign left inferior parotid nodes (0/2). Adequately surgically treated.    Recommendation: Referral to medical oncology (stage IIB).  Annual H&P.    Review Multidisciplinary Cutaneous Oncology Conference recommendation with patient.  Continue routine follow up and total body skin exams with Jann Cárdenas.    Follow Up:  Jann Valenzuela, Medical Oncology      History and Physical Exam  Dermatologic History:   84 y.o. male with a biopsy of the left inferior lateral neck on 1/13/2025 showing an ulcerated melanoma,  with features of desmoplastic and a dedifferentiated melanoma  , Breslow: at least 2.2 mm, broadly transected on the deep margin.     S/p WLE with 2 cm margins and SLNB on 2/27/2025, showing melanoma extending to a depth of 3.4 mm, margins clear. SLNB shows 2 benign left inferior parotid nodes (0/2).    Past Medical History:    Past Medical History:   Diagnosis Date    Anxiety     Coronary artery disease     ED (erectile dysfunction)     HISTORY    Hearing aid worn     HL (hearing loss)     [place] (hard of hearing)     Hyperlipidemia     Hypertension     Melanoma (Multi)        Family History of DNS/MM:  Unknown    Skin:   Left inferior anterior neck with sub-centimeter crusted-over scar     Lymphatic:  No LAD, no thyroid masses, trachea midline        Pathology  Dermatopathology- DERM LAB: [id number]   Collected 2/27/2025 11:42     A. SKIN, WIDE LOCAL EXCISION OF LEFT NECK, SHORT STITCH SUPERIOR APEX, LONG STITCH ANTERIOR, WIDE EXCISION:  --INVASIVE MALIGNANT MELANOMA, EXTENDING TO A DEPTH OF 3.4MM, AND PRIOR PROCEDURE SITE CHANGES, INKED MARGINS FREE IN THESE PLANES OF SECTION (SEE COMMENT):     Comment: In block A5 under solar elastosis and prior procedure site changes, there are pleomorphic spindle cells in thickened stroma which were compared to the original case [id number]. There are multifocal incidental actinic keratoses throughout the excision specimen. There is focal prior procedure site changes in block A5 that are clear of the inked margins.      This case has been amended to change the pT category to pT3b.      B. NODE, SENTINEL LYMPH NODE #1 LEFT INFERIOR PAROTID, SENTINEL LYMPH NODE EXCISION:  BENIGN LYMPH NODE (SEE COMMENT)     Comment: Immunostains for HMB-45, Melan-A, and SOX-10 (controls appropriate) are negative for definitive malignancy.       C. NODE, SENTIMEL NLYMP NODE #2 LEFT INFERIOR PAROTID, SENTINEL LYMPH NODE EXCISION:  BENIGN LYMPH NODE AND PAROTID TISSUE (SEE COMMENT)     Comment: Immunostains for HMB-45, Melan-A, and SOX-10 (controls appropriate) are negative for definitive malignancy.       **Electronically signed out by MAVIS COLLADO MD**     SPECIMEN   Procedure  Excision     [place] node(s) biopsy   Specimen Laterality  Left   TUMOR   Tumor Site  Skin of scalp and neck: Left Neck        Histologic Type  features of mixed desmoplastic with dedifferentiation   Maximum Tumor (Breslow) Thickness (Millimeters)  3.4 mm   Macroscopic Satellite Nodule(s)  Cannot be determined: clinical information not given   Ulceration  Present   Anatomic (Luis) Level  IV (Melanoma invades reticular dermis)   Mitotic Rate  1 mitoses per mm2   Microsatellite(s)  Not identified   Lymphovascular Invasion  Not identified   Neurotropism  Not  identified   MARGINS     Margin Status for Invasive Melanoma  All margins negative for invasive melanoma   Closest Margin Location(s) to Invasive Melanoma  3mm to deep   REGIONAL LYMPH NODES     Regional Lymph Node Status  All regional lymph nodes negative for tumor   Total Number of Lymph Nodes Examined  2   Number of [place] Nodes Examined  2   PATHOLOGIC STAGE CLASSIFICATION (pTNM, AJCC 8th Edition)     pT Category  pT3b   pN Category  pN0   Comment(s)  Residual melanoma in block A5     ___________________________________________________________________________________________________    Dermpath Consult: [id number]     13 SLIDES, [facility], #[id number] (BX: 01/13/2025)     SKIN, LEFT INFERIOR LATERAL NECK, SHAVE BIOPSY:  ULCERATED MALIGNANT MELANOMA, BRESLOW THICKNESS AT LEAST 2.2 MM, PRESENT ON THE DEEP AND PERIPHERAL MARGIN, SEE NOTE.     Note: Microscopic examination reveals a specimen that extends into the mid to deep reticular dermis. There is an ulcer and there are atypical spindle cells with areas of dense solar elastosis. A moderate number of the spindle cells stain with antibodies against SOX-10 and S100 predominantly at the periphery. Centrally they do not stain with antibodies against SOX-10 or S100 and stain with antibodies against CD10. They do not stain with antibodies against MART/Melan-A, HMB45, CD31, CD34, CKAE1/AE3, Desmin or p63. They stain with antibodies against p53 and centrally they stain moderately with antibodies against Actin.      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      ** Electronically signed out by Gaurav Cantrell MD **    SPECIMEN   Procedure  Biopsy, shave   Specimen Laterality  Left   TUMOR   Tumor Site  Skin of scalp and neck: Left inferior lateral neck          Histologic Type  Features of desmoplastic melanoma and a dedifferentiated melanoma.      Maximum Tumor (Breslow) Thickness (Millimeters)  At least: 2.2 mm     Broadly transected on the deep margin   Ulceration  Not identified   Anatomic (Luis) Level  At least level: IV     Broadly transected on the deep margin   Mitotic Rate  1 mitoses per mm2   Microsatellite(s)  Not identified   Lymphovascular Invasion  Not identified   Neurotropism  Not identified   Tumor-Infiltrating Lymphocytes  Present, nonbrisk   Tumor Regression  Not identified   MARGINS     Margin Status for Invasive Melanoma  Invasive melanoma present at margin   Margin(s) Involved by Invasive Melanoma  Deep   Margin Status for Melanoma in situ  All margins negative for melanoma in situ   PATHOLOGIC STAGE CLASSIFICATION (pTNM, AJCC 8th Edition)     pT Category  pT3b     Radiology      Clinical Images  2/7/2025

## 2025-03-19 ENCOUNTER — TELEPHONE (OUTPATIENT)
Dept: OTOLARYNGOLOGY | Facility: HOSPITAL | Age: 85
End: 2025-03-19
Payer: MEDICARE

## 2025-03-19 NOTE — TELEPHONE ENCOUNTER
Returned call to Mr. MehtaParnell. He thought Dr. Drummond had called, but it was actually [facility] calling to schedule a consultation. His wife was able to call back and he has an appt on 3/24. All questions answered.

## 2025-03-24 ENCOUNTER — PATIENT OUTREACH (OUTPATIENT)
Dept: HEMATOLOGY/ONCOLOGY | Facility: HOSPITAL | Age: 85
End: 2025-03-24
Payer: MEDICARE

## 2025-03-24 ENCOUNTER — OFFICE VISIT (OUTPATIENT)
Dept: HEMATOLOGY/ONCOLOGY | Facility: CLINIC | Age: 85
End: 2025-03-24
Payer: MEDICARE

## 2025-03-24 ENCOUNTER — LAB (OUTPATIENT)
Dept: LAB | Facility: CLINIC | Age: 85
End: 2025-03-24
Payer: MEDICARE

## 2025-03-24 VITALS
BODY MASS INDEX: 20.9 KG/M2 | WEIGHT: 137.9 LBS | HEIGHT: 68 IN | SYSTOLIC BLOOD PRESSURE: 131 MMHG | TEMPERATURE: 97 F | HEART RATE: 62 BPM | OXYGEN SATURATION: 99 % | RESPIRATION RATE: 14 BRPM | DIASTOLIC BLOOD PRESSURE: 70 MMHG

## 2025-03-24 DIAGNOSIS — C43.4 MELANOMA OF NECK (MULTI): ICD-10-CM

## 2025-03-24 LAB
CREAT SERPL-MCNC: 0.76 MG/DL (ref 0.5–1.3)
EGFRCR SERPLBLD CKD-EPI 2021: 88 ML/MIN/1.73M*2

## 2025-03-24 PROCEDURE — 99215 OFFICE O/P EST HI 40 MIN: CPT | Performed by: INTERNAL MEDICINE

## 2025-03-24 PROCEDURE — 36415 COLL VENOUS BLD VENIPUNCTURE: CPT

## 2025-03-24 PROCEDURE — 1036F TOBACCO NON-USER: CPT | Performed by: INTERNAL MEDICINE

## 2025-03-24 PROCEDURE — 82565 ASSAY OF CREATININE: CPT

## 2025-03-24 SDOH — ECONOMIC STABILITY: FOOD INSECURITY: WITHIN THE PAST 12 MONTHS, YOU WORRIED THAT YOUR FOOD WOULD RUN OUT BEFORE YOU GOT MONEY TO BUY MORE.: NEVER TRUE

## 2025-03-24 SDOH — ECONOMIC STABILITY: INCOME INSECURITY: IN THE LAST 12 MONTHS, WAS THERE A TIME WHEN YOU WERE NOT ABLE TO PAY THE MORTGAGE OR RENT ON TIME?: NO

## 2025-03-24 SDOH — ECONOMIC STABILITY: FOOD INSECURITY: WITHIN THE PAST 12 MONTHS, THE FOOD YOU BOUGHT JUST DIDN'T LAST AND YOU DIDN'T HAVE MONEY TO GET MORE.: NEVER TRUE

## 2025-03-24 SDOH — HEALTH STABILITY: PHYSICAL HEALTH: ON AVERAGE, HOW MANY DAYS PER WEEK DO YOU ENGAGE IN MODERATE TO STRENUOUS EXERCISE (LIKE A BRISK WALK)?: 7 DAYS

## 2025-03-24 SDOH — ECONOMIC STABILITY: GENERAL
WHICH OF THE FOLLOWING DO YOU KNOW HOW TO USE AND HAVE ACCESS TO EVERY DAY? (CHOOSE ALL THAT APPLY): SMARTPHONE WITH CELLULAR DATA PLAN;DESKTOP COMPUTER, LAPTOP COMPUTER, OR TABLET WITH BROADBAND INTERNET CONNECTION

## 2025-03-24 SDOH — ECONOMIC STABILITY: TRANSPORTATION INSECURITY
IN THE PAST 12 MONTHS, HAS THE LACK OF TRANSPORTATION KEPT YOU FROM MEDICAL APPOINTMENTS OR FROM GETTING MEDICATIONS?: NO

## 2025-03-24 SDOH — HEALTH STABILITY: PHYSICAL HEALTH: ON AVERAGE, HOW MANY MINUTES DO YOU ENGAGE IN EXERCISE AT THIS LEVEL?: 30 MIN

## 2025-03-24 SDOH — ECONOMIC STABILITY: GENERAL
WHICH OF THE FOLLOWING WOULD YOU LIKE TO GET CONNECTED TO IN ORDER TO RECEIVE A DISCOUNT OR FOR FREE? (CHOOSE ALL THAT APPLY): PATIENT DECLINED

## 2025-03-24 SDOH — ECONOMIC STABILITY: TRANSPORTATION INSECURITY
IN THE PAST 12 MONTHS, HAS LACK OF TRANSPORTATION KEPT YOU FROM MEETINGS, WORK, OR FROM GETTING THINGS NEEDED FOR DAILY LIVING?: NO

## 2025-03-24 ASSESSMENT — PATIENT HEALTH QUESTIONNAIRE - PHQ9
SUM OF ALL RESPONSES TO PHQ9 QUESTIONS 1 AND 2: 0
SUM OF ALL RESPONSES TO PHQ9 QUESTIONS 1 & 2: 0
2. FEELING DOWN, DEPRESSED OR HOPELESS: NOT AT ALL
2. FEELING DOWN, DEPRESSED OR HOPELESS: NOT AT ALL
1. LITTLE INTEREST OR PLEASURE IN DOING THINGS: NOT AT ALL
1. LITTLE INTEREST OR PLEASURE IN DOING THINGS: NOT AT ALL

## 2025-03-24 ASSESSMENT — ENCOUNTER SYMPTOMS
ENDOCRINE NEGATIVE: 1
COUGH: 0
MUSCULOSKELETAL NEGATIVE: 1
CHEST TIGHTNESS: 0
LEG SWELLING: 0
DEPRESSION: 0
HEADACHES: 0
PALPITATIONS: 0
CHILLS: 0
EXTREMITY WEAKNESS: 0
FATIGUE: 0
DIAPHORESIS: 0
OCCASIONAL FEELINGS OF UNSTEADINESS: 0
DIZZINESS: 0
LIGHT-HEADEDNESS: 0
LOSS OF SENSATION IN FEET: 0
ADENOPATHY: 0
NUMBNESS: 0
SPEECH DIFFICULTY: 0
PSYCHIATRIC NEGATIVE: 1
UNEXPECTED WEIGHT CHANGE: 0
FEVER: 0
SHORTNESS OF BREATH: 0
GASTROINTESTINAL NEGATIVE: 1
APPETITE CHANGE: 0
EYES NEGATIVE: 1

## 2025-03-24 ASSESSMENT — SOCIAL DETERMINANTS OF HEALTH (SDOH)
HOW OFTEN DO YOU ATTENT MEETINGS OF THE CLUB OR ORGANIZATION YOU BELONG TO?: NEVER
IN A TYPICAL WEEK, HOW MANY TIMES DO YOU TALK ON THE PHONE WITH FAMILY, FRIENDS, OR NEIGHBORS?: THREE TIMES A WEEK
HOW OFTEN DO YOU ATTEND CHURCH OR RELIGIOUS SERVICES?: MORE THAN 4 TIMES PER YEAR
WITHIN THE LAST YEAR, HAVE YOU BEEN HUMILIATED OR EMOTIONALLY ABUSED IN OTHER WAYS BY YOUR PARTNER OR EX-PARTNER?: NO
HOW HARD IS IT FOR YOU TO PAY FOR THE VERY BASICS LIKE FOOD, HOUSING, MEDICAL CARE, AND HEATING?: NOT HARD AT ALL
WITHIN THE LAST YEAR, HAVE YOU BEEN KICKED, HIT, SLAPPED, OR OTHERWISE PHYSICALLY HURT BY YOUR PARTNER OR EX-PARTNER?: NO
WITHIN THE LAST YEAR, HAVE TO BEEN RAPED OR FORCED TO HAVE ANY KIND OF SEXUAL ACTIVITY BY YOUR PARTNER OR EX-PARTNER?: NO
HOW OFTEN DO YOU GET TOGETHER WITH FRIENDS OR RELATIVES?: TWICE A WEEK
IN THE PAST 12 MONTHS, HAS THE ELECTRIC, GAS, OIL, OR WATER COMPANY THREATENED TO SHUT OFF SERVICE IN YOUR HOME?: NO
WITHIN THE LAST YEAR, HAVE YOU BEEN AFRAID OF YOUR PARTNER OR EX-PARTNER?: NO
DO YOU BELONG TO ANY CLUBS OR ORGANIZATIONS SUCH AS CHURCH GROUPS UNIONS, FRATERNAL OR ATHLETIC GROUPS, OR SCHOOL GROUPS?: NO

## 2025-03-24 ASSESSMENT — LIFESTYLE VARIABLES
AUDIT-C TOTAL SCORE: 0
HOW MANY STANDARD DRINKS CONTAINING ALCOHOL DO YOU HAVE ON A TYPICAL DAY: PATIENT DOES NOT DRINK
SKIP TO QUESTIONS 9-10: 1
HOW OFTEN DO YOU HAVE A DRINK CONTAINING ALCOHOL: NEVER
HOW OFTEN DO YOU HAVE SIX OR MORE DRINKS ON ONE OCCASION: NEVER

## 2025-03-24 ASSESSMENT — COLUMBIA-SUICIDE SEVERITY RATING SCALE - C-SSRS
2. HAVE YOU ACTUALLY HAD ANY THOUGHTS OF KILLING YOURSELF?: NO
6. HAVE YOU EVER DONE ANYTHING, STARTED TO DO ANYTHING, OR PREPARED TO DO ANYTHING TO END YOUR LIFE?: NO
1. IN THE PAST MONTH, HAVE YOU WISHED YOU WERE DEAD OR WISHED YOU COULD GO TO SLEEP AND NOT WAKE UP?: NO

## 2025-03-24 ASSESSMENT — PAIN SCALES - GENERAL: PAINLEVEL_OUTOF10: 0-NO PAIN

## 2025-03-24 NOTE — PROGRESS NOTES
INITIAL CONSULTATION: MELANOMA    Referring Surgeon: Andrew Drummond MD  Dermatologist: Jann Cárdenas MD    Melanoma type: mixed; desmoplastic and dedifferentiated  Location of primary site: left inferior lateral neck  Date of WLE and SLNB: 2/27/25  Final pathology:   [place] Depth- 2.2 mm  Ulceration status- present  Dermal mitotic rate: 1/mm2  Microsatellitosis: not present  Pure desmoplasia: no  Lymphovascular invasion: no  Neutropism/perineural invasion: no  Margins: positive negative   [place] lymph node status: 0/2 positive lymph nodes   Pathological Stage: T3bN0    Imaging:   Initial MRI brain with contrast: will order today  Initial Full body PET scan: will order today    HPI: To Parnell is a 85 y.o. year old male who presents today to clinic for initial evaluation of melanoma.   History of atypical or dysplastic nevi: no  History of an unusually large number of nevi: no  History of melanoma in situ or malignant melanoma: no  History of non-melanoma skin cancers: no  History of other malignancies: no  Ability to tan vs. Burn:  burn  Sunscreen usage pattern: recently yes  Relevant ultraviolet light exposure: no  Geographic factors: no  Blistering sun burns: yes  Tanning bed usage: no  Relevant occupational exposure: no  Relevant recreational exposure: no    PMH: HTN, HLD, vit D deficiency    Family history: none that he or his wife can recall     ROS: Review of Systems   Constitutional:  Negative for appetite change, chills, diaphoresis, fatigue, fever and unexpected weight change.   HENT:  Negative.     Eyes: Negative.    Respiratory:  Negative for chest tightness, cough and shortness of breath.    Cardiovascular:  Negative for chest pain, leg swelling and palpitations.   Gastrointestinal: Negative.    Endocrine: Negative.    Musculoskeletal: Negative.  Negative for gait problem.   Neurological:  Negative for dizziness, extremity weakness, gait problem, headaches, light-headedness, numbness  and speech difficulty.   Hematological:  Negative for adenopathy.   Psychiatric/Behavioral: Negative.         PE:  Physical Exam  Vitals reviewed.   Constitutional:       Appearance: Normal appearance. He is well-developed.   HENT:      Head: Normocephalic and atraumatic.      Right Ear: External ear normal. No tenderness.      Left Ear: External ear normal. No tenderness.      Nose: Nose normal.      Mouth/Throat:      Mouth: Mucous membranes are moist. No injury or oral lesions.      Tongue: No lesions.      Pharynx: Oropharynx is clear.   Eyes:      Extraocular Movements: Extraocular movements intact.      Conjunctiva/sclera: Conjunctivae normal.      Pupils: Pupils are equal, round, and reactive to light.   Neck:      Thyroid: No thyroid mass.   Abdominal:      General: There is no abdominal bruit.   Musculoskeletal:         General: Normal range of motion.      Cervical back: Normal range of motion. No signs of trauma. Normal range of motion.   Lymphadenopathy:      Upper Body:      Right upper body: No axillary adenopathy.      Left upper body: No axillary adenopathy.   Skin:     General: Skin is warm and dry.      Comments: No new skin lesions   Neurological:      General: No focal deficit present.      Mental Status: He is alert and oriented to person, place, and time. Mental status is at baseline.      Gait: Gait is intact.   Psychiatric:         Mood and Affect: Mood and affect normal.         Behavior: Behavior normal. Behavior is cooperative.         Thought Content: Thought content normal.         Judgment: Judgment normal.       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 We went over his history, pathology report, imaging and natural history of the disease. For stage IIB, we discussed pursuing active surveillance vs. Immunotherapy. We discussed that while the OS  data is still maturing, there is already evidence of a significant reduction in relapse events. This has to be weighed against the potential of IRAE related side effects.    We discussed surveillance schedule which includes  Skin exam with dermatology every 4 months for the first 2 years followed every 6 months for year 3-5. Beyond 5 years, exams can be yearly or timed per discussion with dermatology.   Regional ultrasound surveillance every 4-6 months for the first 2 years followed every 6 months for year 3-5. Beyond 5 years, ultrasounds performed as needed for clinical concerns.   CT of the chest, abdomen, and pelvis every six months for up to three years, then annually for up to five years. Beyond 5 years, exams can be yearly or timed per discussion with dermatology.   MRI brain with contrast is not routine and only performed as needed for clinical concerns.     We discussed that for immunotherapy, I would recommend adjuvant pembrolizumab for one year ([id number]). I recommend pembrolizumab over nivolumab given the ability to extend dosing to q6 weeks (nivolumab can be extended to b3fprim). We discussed treatment route, efficacy, and possible JODY. I would recommend pembrolizumab 200 mg m6ivzan x 1 year to start. I gave him patient information on this treatment.     Recommendations will change based on staging scans (CT C/A/P and MRI brain)    RTC post scans.     To Parnell understands the plan and has no further questions. he will contact us if there are any new concerns or change in clinical picture.     Tanya Mercedes MD  Attending Physician  [facility]     of Medicine  [facility] [employer]

## 2025-03-25 LAB
ATRIAL RATE: 68 BPM
P AXIS: 34 DEGREES
P OFFSET: 186 MS
P ONSET: 136 MS
PR INTERVAL: 168 MS
Q ONSET: 220 MS
QRS COUNT: 11 BEATS
QRS DURATION: 86 MS
QT INTERVAL: 384 MS
QTC CALCULATION(BAZETT): 408 MS
QTC FREDERICIA: 400 MS
R AXIS: -11 DEGREES
T AXIS: 65 DEGREES
T OFFSET: 412 MS
VENTRICULAR RATE: 68 BPM

## 2025-03-27 ENCOUNTER — HOSPITAL ENCOUNTER (OUTPATIENT)
Dept: RADIOLOGY | Facility: HOSPITAL | Age: 85
Discharge: HOME | End: 2025-03-27
Payer: MEDICARE

## 2025-03-27 DIAGNOSIS — C43.4 MELANOMA OF NECK (MULTI): ICD-10-CM

## 2025-03-27 PROCEDURE — 2550000001 HC RX 255 CONTRASTS: Performed by: INTERNAL MEDICINE

## 2025-03-27 PROCEDURE — A9575 INJ GADOTERATE MEGLUMI 0.1ML: HCPCS | Performed by: INTERNAL MEDICINE

## 2025-03-27 PROCEDURE — 70553 MRI BRAIN STEM W/O & W/DYE: CPT

## 2025-03-27 PROCEDURE — 74177 CT ABD & PELVIS W/CONTRAST: CPT

## 2025-03-27 RX ORDER — GADOTERATE MEGLUMINE 376.9 MG/ML
13 INJECTION INTRAVENOUS
Status: COMPLETED | OUTPATIENT
Start: 2025-03-27 | End: 2025-03-27

## 2025-03-27 RX ADMIN — IOHEXOL 75 ML: 350 INJECTION, SOLUTION INTRAVENOUS at 18:57

## 2025-03-27 RX ADMIN — GADOTERATE MEGLUMINE 13 ML: 376.9 INJECTION INTRAVENOUS at 19:40

## 2025-04-07 ENCOUNTER — OFFICE VISIT (OUTPATIENT)
Dept: HEMATOLOGY/ONCOLOGY | Facility: CLINIC | Age: 85
End: 2025-04-07
Payer: MEDICARE

## 2025-04-07 VITALS
RESPIRATION RATE: 18 BRPM | BODY MASS INDEX: 21.09 KG/M2 | HEART RATE: 67 BPM | DIASTOLIC BLOOD PRESSURE: 74 MMHG | WEIGHT: 138.2 LBS | OXYGEN SATURATION: 98 % | SYSTOLIC BLOOD PRESSURE: 133 MMHG | TEMPERATURE: 97.5 F

## 2025-04-07 DIAGNOSIS — C43.4 MELANOMA OF NECK (MULTI): ICD-10-CM

## 2025-04-07 PROCEDURE — 99215 OFFICE O/P EST HI 40 MIN: CPT | Performed by: INTERNAL MEDICINE

## 2025-04-07 PROCEDURE — 1126F AMNT PAIN NOTED NONE PRSNT: CPT | Performed by: INTERNAL MEDICINE

## 2025-04-07 PROCEDURE — 1160F RVW MEDS BY RX/DR IN RCRD: CPT | Performed by: INTERNAL MEDICINE

## 2025-04-07 PROCEDURE — 1036F TOBACCO NON-USER: CPT | Performed by: INTERNAL MEDICINE

## 2025-04-07 PROCEDURE — 1159F MED LIST DOCD IN RCRD: CPT | Performed by: INTERNAL MEDICINE

## 2025-04-07 ASSESSMENT — ENCOUNTER SYMPTOMS
CONSTIPATION: 0
DIAPHORESIS: 0
FATIGUE: 0
FEVER: 0
ABDOMINAL PAIN: 0
LIGHT-HEADEDNESS: 0
WOUND: 0
SPEECH DIFFICULTY: 0
HEADACHES: 0
SHORTNESS OF BREATH: 0
EXTREMITY WEAKNESS: 0
UNEXPECTED WEIGHT CHANGE: 0
APPETITE CHANGE: 0
EYE PROBLEMS: 0
NUMBNESS: 0
CHILLS: 0
SEIZURES: 0
ADENOPATHY: 0
BACK PAIN: 0
ARTHRALGIAS: 0
VOMITING: 0
ABDOMINAL DISTENTION: 0
PALPITATIONS: 0
BRUISES/BLEEDS EASILY: 0
LEG SWELLING: 0
CHEST TIGHTNESS: 0
NAUSEA: 0
COUGH: 0
ROS SKIN COMMENTS: NO NEW LESIONS OR MASSES
DIARRHEA: 0

## 2025-04-07 ASSESSMENT — COLUMBIA-SUICIDE SEVERITY RATING SCALE - C-SSRS
6. HAVE YOU EVER DONE ANYTHING, STARTED TO DO ANYTHING, OR PREPARED TO DO ANYTHING TO END YOUR LIFE?: NO
2. HAVE YOU ACTUALLY HAD ANY THOUGHTS OF KILLING YOURSELF?: NO
1. IN THE PAST MONTH, HAVE YOU WISHED YOU WERE DEAD OR WISHED YOU COULD GO TO SLEEP AND NOT WAKE UP?: NO

## 2025-04-07 ASSESSMENT — PAIN SCALES - GENERAL: PAINLEVEL_OUTOF10: 0-NO PAIN

## 2025-04-07 ASSESSMENT — PATIENT HEALTH QUESTIONNAIRE - PHQ9
SUM OF ALL RESPONSES TO PHQ9 QUESTIONS 1 AND 2: 0
2. FEELING DOWN, DEPRESSED OR HOPELESS: NOT AT ALL
1. LITTLE INTEREST OR PLEASURE IN DOING THINGS: NOT AT ALL

## 2025-04-07 NOTE — PROGRESS NOTES
CUTANEOUS ONCOLOGY: FOLLOW UP    Diagnosis: Stage IIB ([id number])   Location: left inferior lateral neck   Oncologic history (see initial consultation note for more detail)    Current therapy: deciding today    Interval history: To Parnell is a 85 y.o. year old male who presents today to clinic for melanoma fu. He feels great.     ROS: Review of Systems   Constitutional:  Negative for appetite change, chills, diaphoresis, fatigue, fever and unexpected weight change.   HENT:   Negative for lump/mass.    Eyes:  Negative for eye problems.   Respiratory:  Negative for chest tightness, cough and shortness of breath.    Cardiovascular:  Negative for chest pain, leg swelling and palpitations.   Gastrointestinal:  Negative for abdominal distention, abdominal pain, constipation, diarrhea, nausea and vomiting.   Musculoskeletal:  Negative for arthralgias, back pain and gait problem.   Skin:  Negative for itching, rash and wound.        No new lesions or masses   Neurological:  Negative for extremity weakness, gait problem, headaches, light-headedness, numbness, seizures and speech difficulty.   Hematological:  Negative for adenopathy. Does not bruise/bleed easily.     PE:  Physical Exam  Vitals reviewed.   Constitutional:       Appearance: Normal appearance. He is well-developed.   HENT:      Head: Normocephalic and atraumatic.      Right Ear: External ear normal. No tenderness.      Left Ear: External ear normal. No tenderness.      Nose: Nose normal.      Mouth/Throat:      Mouth: Mucous membranes are moist. No injury or oral lesions.      Tongue: No lesions.      Pharynx: Oropharynx is clear.   Eyes:      Extraocular Movements: Extraocular movements intact.      Conjunctiva/sclera: Conjunctivae normal.      Pupils: Pupils are equal, round, and reactive to light.   Neck:      Thyroid: No thyroid mass.   Abdominal:      General: There is no abdominal bruit.   Musculoskeletal:         General: Normal range of  motion.      Cervical back: Normal range of motion. No signs of trauma. Normal range of motion.   Lymphadenopathy:      Upper Body:      Right upper body: No axillary adenopathy.      Left upper body: No axillary adenopathy.   Skin:     General: Skin is warm and dry.      Comments: No new skin lesions   Neurological:      General: No focal deficit present.      Mental Status: He is alert and oriented to person, place, and time. Mental status is at baseline.      Gait: Gait is intact.   Psychiatric:         Mood and Affect: Mood and affect normal.         Behavior: Behavior normal. Behavior is cooperative.         Thought Content: Thought content normal.         Judgment: Judgment normal.       Imaging:  MR brain w and wo IV contrast 3/28/2025  No evidence of intracranial metastatic disease. 2. No acute intracranial process. 3. Background of age-related volume loss and ischemic white matter disease.       CT chest abdomen pelvis w IV contrast 3/28/2025  No CT evidence of metastatic disease. Incidental findings as described above including cholelithiasis, diverticulosis, etc.      Assessment: Stage IIB     Plan: To Parnell is a 85 y.o. year old male who presents today to clinic for melanoma fu. His stage is finalized as a IIB based on imaging. We discussed active surveillance vs. 1 year of adjuvant therapy. We went over treatment schedule, efficacy, response assessment, potential JODY. He is still not sure. His wife and daughter are present. They will discuss it as a family and come next Monday with a final plan.    To Parnell understands the plan and has no further questions. he will contact us if there are any new concerns or change in clinical picture.     Tanya Mercedes MD  Attending Physician  [facility]     of Medicine  [facility] [employer]

## 2025-04-14 ENCOUNTER — OFFICE VISIT (OUTPATIENT)
Dept: HEMATOLOGY/ONCOLOGY | Facility: CLINIC | Age: 85
End: 2025-04-14
Payer: MEDICARE

## 2025-04-14 VITALS
SYSTOLIC BLOOD PRESSURE: 151 MMHG | DIASTOLIC BLOOD PRESSURE: 77 MMHG | WEIGHT: 138.96 LBS | TEMPERATURE: 98.2 F | BODY MASS INDEX: 21.21 KG/M2 | OXYGEN SATURATION: 99 % | HEART RATE: 69 BPM | RESPIRATION RATE: 18 BRPM

## 2025-04-14 DIAGNOSIS — C43.4 MELANOMA OF NECK (MULTI): ICD-10-CM

## 2025-04-14 PROCEDURE — 1159F MED LIST DOCD IN RCRD: CPT | Performed by: INTERNAL MEDICINE

## 2025-04-14 PROCEDURE — 1126F AMNT PAIN NOTED NONE PRSNT: CPT | Performed by: INTERNAL MEDICINE

## 2025-04-14 PROCEDURE — 99215 OFFICE O/P EST HI 40 MIN: CPT | Performed by: INTERNAL MEDICINE

## 2025-04-14 PROCEDURE — 1036F TOBACCO NON-USER: CPT | Performed by: INTERNAL MEDICINE

## 2025-04-14 PROCEDURE — 1160F RVW MEDS BY RX/DR IN RCRD: CPT | Performed by: INTERNAL MEDICINE

## 2025-04-14 ASSESSMENT — PAIN SCALES - GENERAL: PAINLEVEL_OUTOF10: 0-NO PAIN

## 2025-04-14 ASSESSMENT — ENCOUNTER SYMPTOMS
NUMBNESS: 0
COUGH: 0
CHEST TIGHTNESS: 0
BACK PAIN: 0
ABDOMINAL PAIN: 0
HEADACHES: 0
ARTHRALGIAS: 0
SPEECH DIFFICULTY: 0
LEG SWELLING: 0
EXTREMITY WEAKNESS: 0
DIAPHORESIS: 0
APPETITE CHANGE: 0
ABDOMINAL DISTENTION: 0
LIGHT-HEADEDNESS: 0
CHILLS: 0
ROS SKIN COMMENTS: NO NEW LESIONS OR MASSES
UNEXPECTED WEIGHT CHANGE: 0
PALPITATIONS: 0
ADENOPATHY: 0
BRUISES/BLEEDS EASILY: 0
SEIZURES: 0
WOUND: 0
CONSTIPATION: 0
FATIGUE: 0
FEVER: 0
VOMITING: 0
SHORTNESS OF BREATH: 0
NAUSEA: 0
EYE PROBLEMS: 0
DIARRHEA: 0

## 2025-04-14 ASSESSMENT — PATIENT HEALTH QUESTIONNAIRE - PHQ9
1. LITTLE INTEREST OR PLEASURE IN DOING THINGS: NOT AT ALL
SUM OF ALL RESPONSES TO PHQ9 QUESTIONS 1 AND 2: 0
2. FEELING DOWN, DEPRESSED OR HOPELESS: NOT AT ALL

## 2025-04-14 NOTE — PROGRESS NOTES
CUTANEOUS ONCOLOGY: FOLLOW UP    Diagnosis: Stage IIB ([id number])   Location: left inferior lateral neck   Oncologic history (see initial consultation note for more detail)    Current therapy: deciding today    Interval history: To Parnell is a 85 y.o. year old male who presents today to clinic for melanoma fu. He feels great. He spoke with his family about surveillance vs. Adjuvant therapy.     ROS: Review of Systems   Constitutional:  Negative for appetite change, chills, diaphoresis, fatigue, fever and unexpected weight change.   HENT:   Negative for lump/mass.    Eyes:  Negative for eye problems.   Respiratory:  Negative for chest tightness, cough and shortness of breath.    Cardiovascular:  Negative for chest pain, leg swelling and palpitations.   Gastrointestinal:  Negative for abdominal distention, abdominal pain, constipation, diarrhea, nausea and vomiting.   Musculoskeletal:  Negative for arthralgias, back pain and gait problem.   Skin:  Negative for itching, rash and wound.        No new lesions or masses   Neurological:  Negative for extremity weakness, gait problem, headaches, light-headedness, numbness, seizures and speech difficulty.   Hematological:  Negative for adenopathy. Does not bruise/bleed easily.     PE:  Physical Exam  Vitals reviewed.   Constitutional:       Appearance: Normal appearance. He is well-developed.   HENT:      Head: Normocephalic and atraumatic.      Right Ear: External ear normal. No tenderness.      Left Ear: External ear normal. No tenderness.      Nose: Nose normal.      Mouth/Throat:      Mouth: Mucous membranes are moist. No injury or oral lesions.      Tongue: No lesions.      Pharynx: Oropharynx is clear.   Eyes:      Extraocular Movements: Extraocular movements intact.      Conjunctiva/sclera: Conjunctivae normal.      Pupils: Pupils are equal, round, and reactive to light.   Neck:      Thyroid: No thyroid mass.   Abdominal:      General: There is no  abdominal bruit.   Musculoskeletal:         General: Normal range of motion.      Cervical back: Normal range of motion. No signs of trauma. Normal range of motion.   Lymphadenopathy:      Upper Body:      Right upper body: No axillary adenopathy.      Left upper body: No axillary adenopathy.   Skin:     General: Skin is warm and dry.      Comments: No new skin lesions   Neurological:      General: No focal deficit present.      Mental Status: He is alert and oriented to person, place, and time. Mental status is at baseline.      Gait: Gait is intact.   Psychiatric:         Mood and Affect: Mood and affect normal.         Behavior: Behavior normal. Behavior is cooperative.         Thought Content: Thought content normal.         Judgment: Judgment normal.       Assessment: Stage IIB     Plan: To Parnell is a 85 y.o. year old male who presents today to clinic for melanoma fu. We discussed active surveillance vs. 1 year of adjuvant therapy. We went over treatment schedule, efficacy, response assessment, potential JODY. They decided on active surveillance. We discussed the schedule today.     He is set to establish care with dermatology, Sayra Merrill MD. Earliest apt was in 9/2025. I will message him to see if we can move this up.     To Parnell and his wife understand the plan and have no further questions. he will contact us if there are any new concerns or change in clinical picture.     Tanya Mercedes MD  Attending Physician  [facility]     of Medicine  [facility] [employer]

## 2025-04-29 ENCOUNTER — APPOINTMENT (OUTPATIENT)
Dept: DERMATOLOGY | Facility: CLINIC | Age: 85
End: 2025-04-29
Payer: MEDICARE

## 2025-04-29 DIAGNOSIS — D22.9 MELANOCYTIC NEVUS, UNSPECIFIED LOCATION: ICD-10-CM

## 2025-04-29 DIAGNOSIS — L57.9 SKIN CHANGES DUE TO CHRONIC EXPOSURE TO NONIONIZING RADIATION: ICD-10-CM

## 2025-04-29 DIAGNOSIS — L57.0 ACTINIC KERATOSES: ICD-10-CM

## 2025-04-29 DIAGNOSIS — Z85.820 PERSONAL HISTORY OF MALIGNANT MELANOMA OF SKIN: Primary | ICD-10-CM

## 2025-04-29 DIAGNOSIS — L82.1 SEBORRHEIC KERATOSIS: ICD-10-CM

## 2025-04-29 DIAGNOSIS — L81.4 LENTIGO: ICD-10-CM

## 2025-04-29 DIAGNOSIS — D18.01 HEMANGIOMA OF SKIN: ICD-10-CM

## 2025-04-29 DIAGNOSIS — Z12.83 SCREENING EXAM FOR SKIN CANCER: ICD-10-CM

## 2025-04-29 PROCEDURE — 17000 DESTRUCT PREMALG LESION: CPT | Performed by: STUDENT IN AN ORGANIZED HEALTH CARE EDUCATION/TRAINING PROGRAM

## 2025-04-29 PROCEDURE — 1159F MED LIST DOCD IN RCRD: CPT | Performed by: STUDENT IN AN ORGANIZED HEALTH CARE EDUCATION/TRAINING PROGRAM

## 2025-04-29 PROCEDURE — 99203 OFFICE O/P NEW LOW 30 MIN: CPT | Performed by: STUDENT IN AN ORGANIZED HEALTH CARE EDUCATION/TRAINING PROGRAM

## 2025-04-29 PROCEDURE — 17003 DESTRUCT PREMALG LES 2-14: CPT | Performed by: STUDENT IN AN ORGANIZED HEALTH CARE EDUCATION/TRAINING PROGRAM

## 2025-04-29 NOTE — PROGRESS NOTES
Subjective     To Parnell is a 85 y.o. male who presents for the following: Skin Check (FBSE. Hx of left neck 3.4 mm deep melanoma with negative margins. No concerns today.).     Review of Systems:  No other skin or systemic complaints other than what is documented elsewhere in the note.    The following portions of the chart were reviewed this encounter and updated as appropriate:         Skin Cancer History  Biopsy Log Book  No skin cancers from Specimen Tracking.    Additional History      Specialty Problems    None       Objective   Well appearing patient in no apparent distress; mood and affect are within normal limits.    A full examination was performed including scalp, head, eyes, ears, nose, lips, neck, chest, axillae, abdomen, back, buttocks, bilateral upper extremities, bilateral lower extremities, hands, feet, fingers, toes, fingernails, and toenails. All findings within normal limits unless otherwise noted below.    Assessment/Plan   Skin Exam  1. PERSONAL HISTORY OF MALIGNANT MELANOMA OF SKIN  Generalized  Scar(s) with no evidence of recurrence.    Lymph Nodes  Lymph Nodes-Clinical Exam:   Regional - Negative    There is no evidence of recurrence on clinical examination today, reassurance was provided to the patient. Discussed that hx of skin cancer increases risk of developing future skin cancer and total body skin exams are warranted every 3-4 months  Related Procedures  Follow Up In Dermatology - Established Patient  2. LENTIGO  Generalized  Scattered tan macules in sun-exposed areas.  Benign nature of these skin lesions reviewed and relation to sun exposure discussed. Reassurance provided. Reviewed warning signs of skin cancer.  3. HEMANGIOMA OF SKIN  Generalized  Bright red papules  Discussed benign nature of condition, reassured. Reviewed warning signs of skin cancer with patient.  4. SEBORRHEIC KERATOSIS  Generalized  Stuck on verrucous, tan-brown papules and plaques.    The benign nature of  these skin lesions were reviewed with the patient today and reassurance was provided. Patient advised to return for f/u if the lesions change in size, shape, color, become painful, tender, itch or bleed.  5. MELANOCYTIC NEVUS, UNSPECIFIED LOCATION  Generalized  Benign to slightly atypical appearing brown pigmented macules and papules  Clinically benign appearing nevi, reassurance provided to the patient today. Discussed important of sun protection with sun protective clothing and/or broad spectrum sunscreen spf 30 or above.  ABCDEs of melanoma reviewed. Patient to f/u should they notice any new or changing pre-existing skin lesion.  6. SKIN CHANGES DUE TO CHRONIC EXPOSURE TO NONIONIZING RADIATION  Generalized  Mottled pigmentation, telangiectasias and brown reticular macules in sun exposed areas on the face, extremities, trunk  The risk of chronic, cumulative sun damage and risk of development of skin cancer was reviewed with the patient today. Discussed important of sun protection with sun protective clothing and/or broad spectrum sunscreen spf 30 or above.  Warning signs of skin cancer were reviewed. Patient to contact office should they notice any new or changing pre-existing skin lesion.  7. SCREENING EXAM FOR SKIN CANCER  Generalized  8. ACTINIC KERATOSES (7)  Left Ear (4), Right Ear (3)  Erythematous gritty macule(s)  Lesions are due to chronic, cumulative sun damage over time and are pre-cancerous. They have a risk of developing into squamous cell carcinoma and therefore treatment recommendations were offered and discussed with the patient. Discussed LN2 & topical chemotherapy creams, risks and benefits of each. The risks and benefits of LN2 were reviewed including incomplete removal, crusting, blister hypo and/or hyperpigmentation, scarring. The patient elected for LN2 today.  Destr of lesion - Left Ear (4), Right Ear (3)  Complexity: simple    Destruction method: cryotherapy    Informed consent: discussed  and consent obtained    Lesion destroyed using liquid nitrogen: Yes    Region frozen until ice ball extended beyond lesion: Yes    Cryotherapy cycles:  1  Outcome: patient tolerated procedure well with no complications    Post-procedure details: wound care instructions given

## 2025-04-29 NOTE — Clinical Note
There is no evidence of recurrence on clinical examination today, reassurance was provided to the patient. Discussed that hx of skin cancer increases risk of developing future skin cancer and total body skin exams are warranted every 3-4 months

## 2025-04-29 NOTE — Clinical Note
Scar(s) with no evidence of recurrence.    Lymph Nodes  Lymph Nodes-Clinical Exam:   Regional - Negative

## 2025-06-06 ENCOUNTER — APPOINTMENT (OUTPATIENT)
Facility: CLINIC | Age: 85
End: 2025-06-06
Payer: MEDICARE

## 2025-06-06 VITALS — WEIGHT: 138 LBS | BODY MASS INDEX: 21.06 KG/M2

## 2025-06-06 DIAGNOSIS — C43.4 MELANOMA OF NECK (MULTI): ICD-10-CM

## 2025-06-06 PROCEDURE — 1160F RVW MEDS BY RX/DR IN RCRD: CPT | Performed by: OTOLARYNGOLOGY

## 2025-06-06 PROCEDURE — 99213 OFFICE O/P EST LOW 20 MIN: CPT | Performed by: OTOLARYNGOLOGY

## 2025-06-06 PROCEDURE — 1036F TOBACCO NON-USER: CPT | Performed by: OTOLARYNGOLOGY

## 2025-06-06 PROCEDURE — 1159F MED LIST DOCD IN RCRD: CPT | Performed by: OTOLARYNGOLOGY

## 2025-06-06 ASSESSMENT — PATIENT HEALTH QUESTIONNAIRE - PHQ9
2. FEELING DOWN, DEPRESSED OR HOPELESS: NOT AT ALL
SUM OF ALL RESPONSES TO PHQ9 QUESTIONS 1 AND 2: 0
1. LITTLE INTEREST OR PLEASURE IN DOING THINGS: NOT AT ALL

## 2025-06-06 NOTE — PROGRESS NOTES
ENT Outpatient Consultation    Chief Complaint: Melanoma  History Of Present Illness  To Parnell is a 85 y.o. male presents for evaluation of left neck melanoma. 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 Notes having some skin lesions frozen off in the past but denies personal history of skin cancer nor familial history of melanoma. No prior ENT surgeries. No symptomatic complaints otherwise since biopsy. Denies issues with neck pain/swelling or weight loss. Has history of heart surgery 10 years ago, on baby aspirin now. Retired . Here with wife.    3/12/25: Patient returns for postop visit.  Underwent wide local excision and sentinel lymph node biopsy.  Final pathology returned with 3.4 mm deep melanoma with negative margins.  There are 2 sentinel lymph nodes that were both negative     6/6/2025: Patient returns for follow-up.  He met with medical oncology and they are planning imaging surveillance.  He has CT scans of the chest abdomen pelvis scheduled in October.  He does not have neck imaging scheduled.  He has no other concerns today  Past Medical History  He has a past medical history of Anxiety, Coronary artery disease, ED (erectile dysfunction), Hearing aid worn, HL (hearing loss), [place] (hard of hearing), Hyperlipidemia, Hypertension, and Melanoma (Multi).    Surgical History  He has a past surgical history that includes Coronary artery bypass graft (2013).     Social History  He reports that he has quit smoking. His smoking use included cigarettes. He has been exposed to tobacco smoke. He has never used smokeless tobacco. He reports that he does not currently use alcohol. He reports that he does not use drugs.    Family History  Family History   Problem Relation Name Age of Onset    Dementia Sister          Allergies  Amoxicillin,  Penicillins, and Ramipril     Physical Exam:  CONSTITUTIONAL:  No acute distress  VOICE:  No hoarseness or other abnormality  RESPIRATION:  Breathing comfortably, no stridor  EYES:  EOM intact, sclera normal  NEURO:  Alert and oriented times 3  HEAD AND FACE:  Symmetric facial features, no masses or lesions, sinuses non-tender to palpation  SALIVARY GLANDS:  Parotid and submandibular glands normal bilaterally  EARS:  Normal external ears, cerumen visible in EAC  NOSE:  External nose midline, anterior rhinoscopy is normal with no visualized mass or polyp  ORAL CAVITY/OROPHARYNX/LIPS:  Normal mucous membranes, normal floor of mouth/tongue/OP, no masses or lesions  PHARYNGEAL WALLS:  No masses or lesions  NECK/LYMPH:  No palpable LAD, no thyroid masses, trachea midline  SKIN: Prior neck incision is well-healed with no surrounding nodularity  PSYCH:  Alert and oriented with appropriate mood and affect       Last Recorded Vitals  Weight 62.6 kg (138 lb).    Relevant Results  Reviewed final pathology    Assessment and Plan  85 y.o. male with new desmoplastic melanoma of left inferolateral neck status post wide local excision and sentinel lymph node biopsy.  [place] lymph node returned as negative.  Final depth on melanoma 3.4 mm.  Stage IIb    - Continue follow-up with dermatology  - Continue follow-up with medical oncology  - We will get an ultrasound of the neck since he does not have neck imaging scheduled.  Will contact him with those results.  Also advised him that I was leaving  and would recommend that he gets established with new provider    Andrew Drummond MD

## 2025-06-09 ENCOUNTER — TELEPHONE (OUTPATIENT)
Facility: CLINIC | Age: 85
End: 2025-06-09
Payer: MEDICARE

## 2025-06-09 NOTE — TELEPHONE ENCOUNTER
Spoke with Mr. Parnell to confirm details for his upcoming neck ultrasound on 6/17 at 1:30pm at the [facility]. All questions answered.

## 2025-06-17 ENCOUNTER — HOSPITAL ENCOUNTER (OUTPATIENT)
Dept: RADIOLOGY | Facility: CLINIC | Age: 85
Discharge: HOME | End: 2025-06-17
Payer: MEDICARE

## 2025-06-17 DIAGNOSIS — C43.4 MELANOMA OF NECK (MULTI): ICD-10-CM

## 2025-06-17 PROCEDURE — 76536 US EXAM OF HEAD AND NECK: CPT | Performed by: RADIOLOGY

## 2025-06-17 PROCEDURE — 76536 US EXAM OF HEAD AND NECK: CPT

## 2025-07-14 ENCOUNTER — TELEPHONE (OUTPATIENT)
Facility: CLINIC | Age: 85
End: 2025-07-14
Payer: MEDICARE

## 2025-07-14 NOTE — TELEPHONE ENCOUNTER
Returned call to Mr. MehtaParnell to review ultrasound results and go over surveillance plan. Left a voicemail with my office number for return call.

## 2025-07-14 NOTE — TELEPHONE ENCOUNTER
Spoke with Mr Parnell and reviewed ultrasound. We also reviewed plan for surveillance, he is still consistently meeting with [facility] and dermatology. I explained that the  Taking over for Dr Drummond will be starting 9/1 so scheduling is not available yet, but I will have him on a reminder and assist with an appt as soon as possible. I encouraged him to contact me sooner with any concerns. He is agreeable to plan, all questions answered.

## 2025-07-19 ENCOUNTER — HOSPITAL ENCOUNTER (EMERGENCY)
Facility: HOSPITAL | Age: 85
Discharge: HOME | End: 2025-07-19
Payer: MEDICARE

## 2025-07-19 ENCOUNTER — APPOINTMENT (OUTPATIENT)
Dept: RADIOLOGY | Facility: HOSPITAL | Age: 85
End: 2025-07-19
Payer: MEDICARE

## 2025-07-19 VITALS
DIASTOLIC BLOOD PRESSURE: 68 MMHG | TEMPERATURE: 97.7 F | OXYGEN SATURATION: 100 % | SYSTOLIC BLOOD PRESSURE: 150 MMHG | HEART RATE: 72 BPM | RESPIRATION RATE: 15 BRPM | BODY MASS INDEX: 19.6 KG/M2 | WEIGHT: 140 LBS | HEIGHT: 71 IN

## 2025-07-19 DIAGNOSIS — T14.8XXA HEMATOMA: Primary | ICD-10-CM

## 2025-07-19 PROCEDURE — 99283 EMERGENCY DEPT VISIT LOW MDM: CPT

## 2025-07-19 PROCEDURE — 73090 X-RAY EXAM OF FOREARM: CPT | Mod: RT

## 2025-07-19 PROCEDURE — 99284 EMERGENCY DEPT VISIT MOD MDM: CPT | Performed by: PHYSICIAN ASSISTANT

## 2025-07-19 PROCEDURE — 73090 X-RAY EXAM OF FOREARM: CPT | Mod: RIGHT SIDE | Performed by: RADIOLOGY

## 2025-07-19 ASSESSMENT — LIFESTYLE VARIABLES
EVER FELT BAD OR GUILTY ABOUT YOUR DRINKING: NO
TOTAL SCORE: 0
HAVE YOU EVER FELT YOU SHOULD CUT DOWN ON YOUR DRINKING: NO
HAVE PEOPLE ANNOYED YOU BY CRITICIZING YOUR DRINKING: NO
EVER HAD A DRINK FIRST THING IN THE MORNING TO STEADY YOUR NERVES TO GET RID OF A HANGOVER: NO

## 2025-07-19 ASSESSMENT — PAIN SCALES - GENERAL: PAINLEVEL_OUTOF10: 0 - NO PAIN

## 2025-07-19 ASSESSMENT — PAIN - FUNCTIONAL ASSESSMENT: PAIN_FUNCTIONAL_ASSESSMENT: 0-10

## 2025-07-19 NOTE — DISCHARGE INSTRUCTIONS
Apply ice to help with swelling but dont apply directly to skin, do not stop your aspirin, continue taking it.  Elevate the arm.  The bruise should heal on its own within a couple of weeks.

## 2025-07-19 NOTE — ED PROVIDER NOTES
Emergency Department Provider Note        History of Present Illness     History provided by: Patient  Limitations to History: None  External Records Reviewed with Brief Summary: None    HPI:  To Parnell is a 85 y.o. male on aspirin who presents today for evaluation of a right forearm bruising, patient states he had a mechanical fall where he tripped over a rug 2 days ago, states he caught himself with both arms but hit the edge of the bed with his right arm.  Patient denies hitting his head or losing consciousness, denies headache.  He denies injuring anything else, states his arms were not even hurting him but he started noticing an area of bruising to the top of his arm and he is been icing it 4 times daily and today noticed that his bruising is significantly worse and spread throughout the arm.  He is still not having any pain, no other injuries.  He is on aspirin and encouraged to keep taking it.    Physical Exam   Triage vitals:  T 36.5 °C (97.7 °F)  HR 72  /68  RR 15  O2 100 % None (Room air)    Physical Exam  Vitals and nursing note reviewed.   Constitutional:       General: He is not in acute distress.     Appearance: Normal appearance. He is not toxic-appearing.   HENT:      Head: Normocephalic and atraumatic.      Nose: Nose normal.     Eyes:      Extraocular Movements: Extraocular movements intact.       Cardiovascular:      Rate and Rhythm: Normal rate and regular rhythm.   Pulmonary:      Effort: Pulmonary effort is normal.   Abdominal:      Palpations: Abdomen is soft.     Musculoskeletal:         General: Normal range of motion.      Cervical back: Normal range of motion and neck supple.      Comments: Bruising to the right arm as shown in pictures, patient has soft compressible compartments, no pain whatsoever, no bony tenderness, 2+ radial pulse, normal sensation cap refill to all 5 digits, full range of motion at all joints of the arm.     Skin:     General: Skin is warm and dry.      Neurological:      General: No focal deficit present.      Mental Status: He is alert.     Psychiatric:         Mood and Affect: Mood normal.         Thought Content: Thought content normal.                Medical Decision Making & ED Course   Medical Decision Makin y.o. male presents today for evaluation of right arm bruising after he fell and caught himself on the bed 2 days ago, this was a mechanical fall, he did not hit his head or lose consciousness, no indication for any additional imaging, I did order an x-ray of his forearm given that that is where the bruising is, suspect that he may have hit right over a vein and now has a hematoma as a result, the risks of stopping aspirin far outweigh the benefits of he is encouraged to keep taking his aspirin as prescribed and to continue icing it, elevating the arm, he is aware that this can take several weeks to completely go away.  Discussed return precautions that would warrant return to the ER.  X-ray was negative for fracture.  ----      Differential diagnoses considered include but are not limited to: Fracture, hematoma, contusion, compartment syndrome, sprain, strain, etc.     Social Determinants of Health which Significantly Impact Care: None identified     EKG Independent Interpretation: EKG not obtained    Independent Result Review and Interpretation: Relevant laboratory and radiographic results were reviewed and independently interpreted by myself.  As necessary, they are commented on in the ED Course.    Chronic conditions affecting the patient's care: As documented above in [facility]    The patient was discussed with the following consultants/services: None    Care Considerations: As documented above in [facility]    ED Course:  Diagnoses as of 25 1711   Hematoma     Disposition   As a result of the work-up, the patient was discharged home.  he was informed of his diagnosis and instructed to come back with any concerns or worsening of condition.  he and  was agreeable to the plan as discussed above.  he was given the opportunity to ask questions.  All of the patient's questions were answered.    Procedures   Procedures    Patient was seen independently    Jo Ann Urbano PA-C  Emergency Medicine       Jo Ann Urbano PA-C  07/19/25 [id number]

## 2025-08-01 ENCOUNTER — HOSPITAL ENCOUNTER (EMERGENCY)
Facility: HOSPITAL | Age: 85
Discharge: HOME | End: 2025-08-01
Attending: STUDENT IN AN ORGANIZED HEALTH CARE EDUCATION/TRAINING PROGRAM
Payer: MEDICARE

## 2025-08-01 VITALS
HEART RATE: 55 BPM | OXYGEN SATURATION: 100 % | HEIGHT: 71 IN | WEIGHT: 140 LBS | DIASTOLIC BLOOD PRESSURE: 62 MMHG | TEMPERATURE: 97.2 F | BODY MASS INDEX: 19.6 KG/M2 | RESPIRATION RATE: 15 BRPM | SYSTOLIC BLOOD PRESSURE: 126 MMHG

## 2025-08-01 DIAGNOSIS — S51.811A LACERATION OF RIGHT FOREARM, INITIAL ENCOUNTER: Primary | ICD-10-CM

## 2025-08-01 PROCEDURE — 90715 TDAP VACCINE 7 YRS/> IM: CPT | Performed by: STUDENT IN AN ORGANIZED HEALTH CARE EDUCATION/TRAINING PROGRAM

## 2025-08-01 PROCEDURE — 99282 EMERGENCY DEPT VISIT SF MDM: CPT | Mod: 25 | Performed by: STUDENT IN AN ORGANIZED HEALTH CARE EDUCATION/TRAINING PROGRAM

## 2025-08-01 PROCEDURE — 99284 EMERGENCY DEPT VISIT MOD MDM: CPT | Performed by: STUDENT IN AN ORGANIZED HEALTH CARE EDUCATION/TRAINING PROGRAM

## 2025-08-01 PROCEDURE — 90471 IMMUNIZATION ADMIN: CPT | Performed by: STUDENT IN AN ORGANIZED HEALTH CARE EDUCATION/TRAINING PROGRAM

## 2025-08-01 PROCEDURE — 2500000004 HC RX 250 GENERAL PHARMACY W/ HCPCS (ALT 636 FOR OP/ED): Performed by: STUDENT IN AN ORGANIZED HEALTH CARE EDUCATION/TRAINING PROGRAM

## 2025-08-01 RX ADMIN — TETANUS TOXOID, REDUCED DIPHTHERIA TOXOID AND ACELLULAR PERTUSSIS VACCINE, ADSORBED 0.5 ML: 5; 2.5; 8; 8; 2.5 SUSPENSION INTRAMUSCULAR at 10:32

## 2025-08-01 ASSESSMENT — PAIN - FUNCTIONAL ASSESSMENT: PAIN_FUNCTIONAL_ASSESSMENT: 0-10

## 2025-08-01 ASSESSMENT — PAIN SCALES - GENERAL: PAINLEVEL_OUTOF10: 0 - NO PAIN

## 2025-08-01 NOTE — DISCHARGE INSTRUCTIONS
You were seen in the emergency department for your right forearm laceration.  The wound was irrigated and Steri-Strips were applied.  The Steri-Strips are to remain in place.  You may exchange the covering gauze as needed for cleanliness.  You may follow-up with your PCP as needed.  Please return to the emergency department if you experience uncontrolled bleeding from the wound or any redness or swelling to the affected area.

## 2025-08-01 NOTE — ED PROVIDER NOTES
Emergency Department Provider Note       History of Present Illness     History provided by: Patient  Limitations to History: None  External Records Reviewed with Brief Summary: None    HPI:  To Parnell is a 85 y.o. male who presents to the emergency department with a approximately 2 cm laceration over the posterior aspect of his right forearm.  He states 2 days ago, his wife tripped in the bathroom and in the process of trying to catch her fall, he scraped his forearm.  He reports at that time the laceration was bleeding so he covered it with a bandage.  After removing the bandage this morning, he reported some minor bleeding from the wound which prompted him to come to the emergency department to be evaluated for possible stitches.  The wound is clean, superficial, with no active bleeding.  There is no surrounding erythema or swelling.  He has a history of CAD on aspirin 81 mg, hypertension, hyperlipidemia. Patient denies headache, fevers, chills, cough, chest pain, shortness of breath.    Physical Exam   Triage vitals:  T 36.2 °C (97.2 °F)  HR 61  /66  RR 16  O2 100 % None (Room air)    General: Awake, alert, in no acute distress  Eyes: Gaze conjugate.  No scleral icterus or injection  HENT: Normo-cephalic, atraumatic. No stridor  CV: Regular rate, regular rhythm. Radial pulses 2+ bilaterally  Resp: Breathing non-labored, speaking in full sentences.  Clear to auscultation bilaterally  GI: Soft, non-distended, non-tender. No rebound or guarding.  MSK/Extremities: No gross bony deformities. Moving all extremities  Skin: Warm. Appropriate color.  Approximately 2 cm superficial laceration to the posterior right forearm.  Neuro: Alert. Oriented. Face symmetric. Speech is fluent.  Gross strength and sensation intact in b/l UE and LEs  Psych: Appropriate mood and affect      Medical Decision Making & ED Course   Medical Decision Makin y.o. male presents to the emergency department with a  approximately 2 cm laceration to the posterior forearm.  This laceration is superficial, without erythema, and not actively bleeding.  I have very low concern for wound infection in this patient.  The wound appeared clean, however it was irrigated today in the ER prior to placement of Steri-Strips followed by gauze.  Patient was instructed to leave the Steri-Strips in place for several days until he fall off on their own.  It was also discovered upon chart review, the patient is due for his Tdap vaccine.  This was discussed with the patient and will be administered prior to discharge.  Patient was counseled on return precautions which included excessive bleeding from the wound and/or signs of infection.  ----      Differential diagnoses considered include but are not limited to: Laceration    Social Determinants of Health which Significantly Impact Care: Social Determinants of Health which Significantly Impact Care: None identified     Chronic conditions affecting the patient's care: As documented above in [facility]    The patient was discussed with the following consultants/services: None    Care Considerations: As documented above in [facility]    ED Course:  Diagnoses as of 08/01/25 1039   Laceration of right forearm, initial encounter       Disposition   As a result of the work-up, the patient was discharged home.  he was informed of his diagnosis and instructed to come back with any concerns or worsening of condition.  he and was agreeable to the plan as discussed above.  he was given the opportunity to ask questions.  All of the patient's questions were answered.    Procedures   Procedures    Patient seen and discussed with ED attending physician.    Regulo Moyer DO  Emergency Medicine                                                       Regulo Moyer DO  08/01/25 1039     oral

## 2025-08-29 ENCOUNTER — APPOINTMENT (OUTPATIENT)
Dept: DERMATOLOGY | Facility: CLINIC | Age: 85
End: 2025-08-29
Payer: MEDICARE

## 2025-08-29 DIAGNOSIS — L57.9 SKIN CHANGES DUE TO CHRONIC EXPOSURE TO NONIONIZING RADIATION: ICD-10-CM

## 2025-08-29 DIAGNOSIS — L81.4 LENTIGO: ICD-10-CM

## 2025-08-29 DIAGNOSIS — Z12.83 SCREENING EXAM FOR SKIN CANCER: ICD-10-CM

## 2025-08-29 DIAGNOSIS — Z85.820 PERSONAL HISTORY OF MALIGNANT MELANOMA OF SKIN: Primary | ICD-10-CM

## 2025-08-29 DIAGNOSIS — D18.01 HEMANGIOMA OF SKIN: ICD-10-CM

## 2025-08-29 DIAGNOSIS — L82.1 SEBORRHEIC KERATOSIS: ICD-10-CM

## 2025-08-29 DIAGNOSIS — D22.9 MELANOCYTIC NEVUS, UNSPECIFIED LOCATION: ICD-10-CM

## 2025-08-29 DIAGNOSIS — L57.0 ACTINIC KERATOSES: ICD-10-CM

## 2025-08-29 PROCEDURE — 17004 DESTROY PREMAL LESIONS 15/>: CPT | Performed by: STUDENT IN AN ORGANIZED HEALTH CARE EDUCATION/TRAINING PROGRAM

## 2025-08-29 PROCEDURE — 99213 OFFICE O/P EST LOW 20 MIN: CPT | Performed by: STUDENT IN AN ORGANIZED HEALTH CARE EDUCATION/TRAINING PROGRAM

## 2025-08-29 PROCEDURE — 1159F MED LIST DOCD IN RCRD: CPT | Performed by: STUDENT IN AN ORGANIZED HEALTH CARE EDUCATION/TRAINING PROGRAM

## 2025-09-03 ENCOUNTER — TELEPHONE (OUTPATIENT)
Facility: CLINIC | Age: 85
End: 2025-09-03
Payer: MEDICARE

## 2025-09-26 ENCOUNTER — APPOINTMENT (OUTPATIENT)
Dept: DERMATOLOGY | Facility: CLINIC | Age: 85
End: 2025-09-26
Payer: MEDICARE

## 2025-12-05 ENCOUNTER — APPOINTMENT (OUTPATIENT)
Dept: DERMATOLOGY | Facility: CLINIC | Age: 85
End: 2025-12-05
Payer: MEDICARE

## (undated) DEVICE — PROBE COVER, ULTRASOUND, 6 X 96, GEL PACKET

## (undated) DEVICE — SUTURE, MONOCRYLIC, 4-0, P3, MONO 18

## (undated) DEVICE — GLOVE, SURGICAL, PROTEXIS PI , 7.5, PF, LF

## (undated) DEVICE — SPONGE, HEMOSTAT, SURGICEL FIBRILLAR, ABS, 4 X 4, LF

## (undated) DEVICE — STRIP, SKIN CLOSURE, STERI STRIP, REINFORCED, 0.5 X 4 IN

## (undated) DEVICE — SOLUTION, IRRIGATION, STERILE WATER, 1000 ML, POUR BOTTLE

## (undated) DEVICE — DRAPE, PAD, INSTRUMENT, MAGNETIC, MEDIUM, 10 X 16 IN, DISPOSABLE

## (undated) DEVICE — PROBE, TRUNODE GAMMA

## (undated) DEVICE — STAPLER, SKIN, PLUS, WIDE, 35

## (undated) DEVICE — NEEDLE, ELECTRODE, ELECTROSURGICAL, INSULATED

## (undated) DEVICE — SUTURE, VICRYL, 3-0,18 IN, SH, UNDYED

## (undated) DEVICE — CORD, CAUTERY, BIOPOLAR FORCEP, 12FT

## (undated) DEVICE — SUTURE, SILK, 2-0, TIES, 12-30 IN, BLACK

## (undated) DEVICE — NEEDLE, HYPODERMIC, REGULAR WALL, REGULAR BEVEL, 30 G X 0.5 IN

## (undated) DEVICE — SHEARS, CURVED 9CM HARMONIC FOCUS

## (undated) DEVICE — TIP, SUCTION, YANKAUER, FLEXIBLE

## (undated) DEVICE — Device

## (undated) DEVICE — SYRINGE, MONOJECT, LUER LOCK, 3 CC, LF

## (undated) DEVICE — SOLUTION, IRRIGATION, SODIUM CHLORIDE 0.9%, 1000 ML, POUR BOTTLE

## (undated) DEVICE — STAY SET, SURGICAL , 5MM SHARP HOOK, CS/ 50

## (undated) DEVICE — MARKER, SKIN, DUAL TIP, W/RULER AND LABEL

## (undated) DEVICE — TOWEL PACK, STERILE, 4/PACK, BLUE

## (undated) DEVICE — PREP, SKIN, BETADINE, SOLUTION, 16 OZ

## (undated) DEVICE — SUTURE, PLAIN, 5-0, 18 IN, PC1, YELLOW

## (undated) DEVICE — SUTURE, SILK, 3-0, 30 IN, MULTIPACK, BLACK

## (undated) DEVICE — CONTAINER, SPECIMEN, 4 OZ, OR PEEL PACK, STERILE